# Patient Record
Sex: MALE | Race: WHITE | NOT HISPANIC OR LATINO | Employment: UNEMPLOYED | URBAN - NONMETROPOLITAN AREA
[De-identification: names, ages, dates, MRNs, and addresses within clinical notes are randomized per-mention and may not be internally consistent; named-entity substitution may affect disease eponyms.]

---

## 2020-09-03 ENCOUNTER — HOSPITAL ENCOUNTER (EMERGENCY)
Facility: HOSPITAL | Age: 60
Discharge: HOME | End: 2020-09-03
Attending: EMERGENCY MEDICINE | Admitting: EMERGENCY MEDICINE

## 2020-09-03 ENCOUNTER — APPOINTMENT (OUTPATIENT)
Dept: GENERAL RADIOLOGY | Facility: HOSPITAL | Age: 60
End: 2020-09-03

## 2020-09-03 VITALS
BODY MASS INDEX: 27.3 KG/M2 | HEART RATE: 85 BPM | TEMPERATURE: 98.6 F | OXYGEN SATURATION: 97 % | HEIGHT: 73 IN | SYSTOLIC BLOOD PRESSURE: 136 MMHG | RESPIRATION RATE: 16 BRPM | DIASTOLIC BLOOD PRESSURE: 91 MMHG | WEIGHT: 206 LBS

## 2020-09-03 DIAGNOSIS — S46.912A LEFT SHOULDER STRAIN, INITIAL ENCOUNTER: Primary | ICD-10-CM

## 2020-09-03 PROCEDURE — 73030 X-RAY EXAM OF SHOULDER: CPT

## 2020-09-03 PROCEDURE — 63710000001 PREDNISONE PER 1 MG: Performed by: PHYSICIAN ASSISTANT

## 2020-09-03 PROCEDURE — 99283 EMERGENCY DEPT VISIT LOW MDM: CPT

## 2020-09-03 RX ORDER — ACETAMINOPHEN 325 MG/1
650 TABLET ORAL ONCE
Status: COMPLETED | OUTPATIENT
Start: 2020-09-03 | End: 2020-09-03

## 2020-09-03 RX ORDER — IBUPROFEN 600 MG/1
600 TABLET ORAL EVERY 8 HOURS PRN
Qty: 18 TABLET | Refills: 0 | Status: SHIPPED | OUTPATIENT
Start: 2020-09-03

## 2020-09-03 RX ORDER — PREDNISONE 20 MG/1
20 TABLET ORAL ONCE
Status: COMPLETED | OUTPATIENT
Start: 2020-09-03 | End: 2020-09-03

## 2020-09-03 RX ORDER — SENNOSIDES 8.6 MG
650 CAPSULE ORAL EVERY 8 HOURS PRN
Qty: 18 TABLET | Refills: 0 | Status: SHIPPED | OUTPATIENT
Start: 2020-09-03

## 2020-09-03 RX ORDER — CYCLOBENZAPRINE HCL 5 MG
5 TABLET ORAL NIGHTLY PRN
Qty: 7 TABLET | Refills: 0 | Status: SHIPPED | OUTPATIENT
Start: 2020-09-03

## 2020-09-03 RX ADMIN — PREDNISONE 20 MG: 20 TABLET ORAL at 18:52

## 2020-09-03 RX ADMIN — ACETAMINOPHEN 650 MG: 325 TABLET, FILM COATED ORAL at 18:52

## 2020-09-03 NOTE — ED PROVIDER NOTES
Subjective   Patient is here with complaint of some soreness left shoulder secondary to a slip and fall over a month ago hitting left shoulder patient is continued have some pain especially with range of motion working, no numbness or chest pain or shortness of air reported patient does endorse some paresthesias at times in the left arm from this injury presents here ambulatory        History provided by:  Patient      Review of Systems   Constitutional: Negative.    HENT: Negative.    Eyes: Negative.    Respiratory: Negative.    Cardiovascular: Negative.  Negative for chest pain.   Musculoskeletal: Positive for arthralgias.   Skin: Negative.    Neurological: Negative.    All other systems reviewed and are negative.      No past medical history on file.    No Known Allergies    No past surgical history on file.    No family history on file.    Social History     Socioeconomic History   • Marital status:      Spouse name: Not on file   • Number of children: Not on file   • Years of education: Not on file   • Highest education level: Not on file           Objective   Physical Exam   Constitutional: He is oriented to person, place, and time. He appears well-developed and well-nourished. No distress.   Nontoxic well-appearing no acute distress   HENT:   Head: Normocephalic and atraumatic.   Eyes: Pupils are equal, round, and reactive to light. EOM are normal.   Neck: Normal range of motion. Neck supple.   No C-spine tenderness   Cardiovascular: Normal rate, regular rhythm and intact distal pulses.   Pulmonary/Chest: Effort normal and breath sounds normal.   Musculoskeletal: He exhibits tenderness. He exhibits no edema or deformity.   Noted tenderness left subacromial fossa left anterior deltoid margin, abduction 90 degrees forward flexion 90 degrees left, internal rotation to approximately L5-S1   Neurological: He is alert and oriented to person, place, and time. No cranial nerve deficit or sensory deficit. He  exhibits normal muscle tone. Coordination normal.   Skin: Skin is warm and dry. Capillary refill takes less than 2 seconds. No rash noted. He is not diaphoretic. No erythema. No pallor.   Psychiatric: He has a normal mood and affect. His behavior is normal. Judgment and thought content normal.   Nursing note and vitals reviewed.      Procedures           ED Course  ED Course as of Sep 03 1919   Thu Sep 03, 2020   1918 Is resting no distress will have patient follow-up with orthopedics... Scheduled to follow-up with orthopedics Dr. Dejuan ibarra, use medication as needed return to the ER for any problems follow-up any worsening concerns    [SC]      ED Course User Index  [SC] Charli Powell PA-C                                           MDM  Number of Diagnoses or Management Options     Amount and/or Complexity of Data Reviewed  Obtain history from someone other than the patient: yes  Review and summarize past medical records: yes  Discuss the patient with other providers: yes    Risk of Complications, Morbidity, and/or Mortality  Presenting problems: low  Diagnostic procedures: low  Management options: low        Final diagnoses:   Left shoulder strain, initial encounter            Charli Powell PA-C  09/03/20 1919

## 2021-04-06 ENCOUNTER — OFFICE VISIT (OUTPATIENT)
Dept: SURGERY | Facility: CLINIC | Age: 61
End: 2021-04-06

## 2021-04-06 VITALS
HEART RATE: 87 BPM | HEIGHT: 73 IN | SYSTOLIC BLOOD PRESSURE: 130 MMHG | WEIGHT: 204 LBS | OXYGEN SATURATION: 98 % | TEMPERATURE: 98.7 F | BODY MASS INDEX: 27.04 KG/M2 | DIASTOLIC BLOOD PRESSURE: 86 MMHG

## 2021-04-06 DIAGNOSIS — R10.11 RIGHT UPPER QUADRANT ABDOMINAL PAIN: ICD-10-CM

## 2021-04-06 DIAGNOSIS — R10.13 EPIGASTRIC PAIN: ICD-10-CM

## 2021-04-06 DIAGNOSIS — R10.84 GENERALIZED ABDOMINAL PAIN: Primary | ICD-10-CM

## 2021-04-06 DIAGNOSIS — Z01.818 PRE-OP TESTING: Primary | ICD-10-CM

## 2021-04-06 PROCEDURE — 99244 OFF/OP CNSLTJ NEW/EST MOD 40: CPT | Performed by: SURGERY

## 2021-04-06 RX ORDER — DM/PE/ACETAMINOPHEN/CHLORPHENR 10-5-325-2
1 TABLET, SEQUENTIAL ORAL 2 TIMES DAILY
COMMUNITY
Start: 2021-02-22

## 2021-04-06 RX ORDER — GLIMEPIRIDE 1 MG/1
TABLET ORAL
COMMUNITY
Start: 2021-03-16

## 2021-04-06 RX ORDER — OMEPRAZOLE 20 MG/1
20 CAPSULE, DELAYED RELEASE ORAL 2 TIMES DAILY
COMMUNITY
Start: 2021-02-22

## 2021-04-06 RX ORDER — MELOXICAM 15 MG/1
TABLET ORAL
COMMUNITY
Start: 2021-01-26

## 2021-04-06 RX ORDER — LEVOTHYROXINE SODIUM 0.07 MG/1
75 TABLET ORAL DAILY
COMMUNITY
Start: 2021-02-22

## 2021-04-06 RX ORDER — ATORVASTATIN CALCIUM 40 MG/1
TABLET, FILM COATED ORAL
COMMUNITY
Start: 2021-02-22

## 2021-04-06 NOTE — PROGRESS NOTES
Patient: Seb Pettit    YOB: 1960    Date: 04/06/2021    Primary Care Provider: Amado Phillips PA    Chief Complaint   Patient presents with   • Cholelithiasis       SUBJECTIVE:    History of present illness:  I saw the patient in the office today as a consultation for evaluation and treatment of right upper quadrant area abdominal pain.  Patient had ultrasound of the gallbladder performed 03/23/2021 which showed gallstones.    The patient describes this discomfort as epigastric and right upper quadrant in nature, radiates into the back, associated with nausea, there is a history significant for reflux, this pain is sharp in nature, worse with food, not relieved.    It is interesting that the patient has had a previous gallbladder ultrasound in [place] which shows evidence of gallstones in the impression of the report but in the body of the report it states that there was no gallstones seen.    The following portions of the patient's history were reviewed and updated as appropriate: allergies, current medications, past family history, past medical history, past social history, past surgical history and problem list.    Review of Systems   Constitutional: Negative for chills, fever and unexpected weight change.   HENT: Negative for trouble swallowing and voice change.    Eyes: Negative for visual disturbance.   Respiratory: Negative for apnea, cough, chest tightness, shortness of breath and wheezing.    Cardiovascular: Negative for chest pain, palpitations and leg swelling.   Gastrointestinal: Positive for abdominal pain. Negative for abdominal distention, anal bleeding, blood in stool, constipation, diarrhea, nausea, rectal pain and vomiting.   Endocrine: Negative for cold intolerance and heat intolerance.   Genitourinary: Negative for difficulty urinating, dysuria, flank pain, scrotal swelling and testicular pain.   Musculoskeletal: Negative for back pain, gait problem and joint swelling.    Skin: Negative for color change, rash and wound.   Neurological: Negative for dizziness, syncope, speech difficulty, weakness, numbness and headaches.   Hematological: Negative for adenopathy. Does not bruise/bleed easily.   Psychiatric/Behavioral: Negative for confusion. The patient is not nervous/anxious.        History:  Past Medical History:   Diagnosis Date   • Diabetes mellitus (CMS/HCC)        History reviewed. No pertinent surgical history.    History reviewed. No pertinent family history.    Social History     Tobacco Use   • Smoking status: Never Smoker   • Smokeless tobacco: Never Used   Vaping Use   • Vaping Use: Never used   Substance Use Topics   • Alcohol use: Never   • Drug use: Never       Allergies:  No Known Allergies    Medications:    Current Outpatient Medications:   •  atorvastatin (LIPITOR) 40 MG tablet, TAKE 1 TABLET BY MOUTH AT BEDTIME- HOLD UNTIL NIACIN HAS BEEN FINISHED, Disp: , Rfl:   •  glimepiride (AMARYL) 1 MG tablet, , Disp: , Rfl:   •  GNP Vitamin D Super Strength 125 MCG (5000 UT) tablet, Take 1 tablet by mouth 2 (Two) Times a Day., Disp: , Rfl:   •  levothyroxine (SYNTHROID, LEVOTHROID) 75 MCG tablet, Take 75 mcg by mouth Daily., Disp: , Rfl:   •  metFORMIN (GLUCOPHAGE) 1000 MG tablet, , Disp: , Rfl:   •  omeprazole (priLOSEC) 20 MG capsule, Take 20 mg by mouth 2 (Two) Times a Day., Disp: , Rfl:   •  acetaminophen (Tylenol 8 Hour) 650 MG 8 hr tablet, Take 1 tablet by mouth Every 8 (Eight) Hours As Needed for Mild Pain ., Disp: 18 tablet, Rfl: 0  •  cyclobenzaprine (FLEXERIL) 5 MG tablet, Take 1 tablet by mouth At Night As Needed for Muscle Spasms., Disp: 7 tablet, Rfl: 0  •  ibuprofen (ADVIL,MOTRIN) 600 MG tablet, Take 1 tablet by mouth Every 8 (Eight) Hours As Needed for Mild Pain ., Disp: 18 tablet, Rfl: 0  •  meloxicam (MOBIC) 15 MG tablet, TAKE 1 TABLET BY MOUTH DAILY; FOR LEG PAIN, Disp: , Rfl:     OBJECTIVE:    Vital Signs:   Vitals:    04/06/21 1310   BP: 130/86   Pulse:  "87   Temp: 98.7 °F (37.1 °C)   SpO2: 98%   Weight: 92.5 kg (204 lb)   Height: 185.4 cm (72.99\")       Physical Exam:   General Appearance:    Alert, cooperative, in no acute distress   Head:    Normocephalic, without obvious abnormality, atraumatic   Eyes:            Lids and lashes normal, conjunctivae and sclerae normal, no   icterus, no pallor, corneas clear, PERRLA   Ears:    Ears appear intact with no abnormalities noted   Throat:   No oral lesions, no thrush, oral mucosa moist   Neck:   No adenopathy, supple, trachea midline, no thyromegaly, no   carotid bruit, no JVD   Lungs:     Clear to auscultation,respirations regular, even and                  unlabored    Heart:    Regular rhythm and normal rate, normal S1 and S2, no            murmur   Abdomen:     no masses, no organomegaly, soft non-tender, non-distended, no guarding, there is evidence of right upper quadrant tenderness, no peritoneal signs   Extremities:   Moves all extremities well, no edema, no cyanosis, no             redness   Pulses:   Pulses palpable and equal bilaterally   Skin:   No bleeding, bruising or rash   Lymph nodes:   No palpable adenopathy   Neurologic:   Cranial nerves 2 - 12 grossly intact, sensation intact      Results Review:   I reviewed the patient's new clinical results.  I reviewed the patient's new imaging results and agree with the interpretation.  I reviewed the patient's other test results and agree with the interpretation    Review of Systems was reviewed and confirmed as accurate as documented by the MA.    ASSESSMENT/PLAN:    1. Generalized abdominal pain    2. Right upper quadrant abdominal pain    3. Epigastric pain        I had a detailed and extensive discussion with the patient and his wife in the office today.  We did our own ultrasound in the office and this showed no evidence of gallstones, I think the patient needs to be scheduled for HIDA scan.  He also needs to undergo upper endoscopy, risk and benefits of " operative versus nonoperative intervention have been discussed with the patient, he understands and agrees, and wishes to proceed.     I discussed the patients findings and my recommendations with patient.    Electronically signed by Luis A Ojeda MD  04/06/21

## 2021-04-14 ENCOUNTER — LAB (OUTPATIENT)
Dept: LAB | Facility: HOSPITAL | Age: 61
End: 2021-04-14

## 2021-04-14 ENCOUNTER — HOSPITAL ENCOUNTER (OUTPATIENT)
Dept: NUCLEAR MEDICINE | Facility: HOSPITAL | Age: 61
Discharge: HOME | End: 2021-04-14

## 2021-04-14 DIAGNOSIS — R10.13 EPIGASTRIC PAIN: ICD-10-CM

## 2021-04-14 DIAGNOSIS — R10.84 GENERALIZED ABDOMINAL PAIN: ICD-10-CM

## 2021-04-14 DIAGNOSIS — R10.11 RIGHT UPPER QUADRANT ABDOMINAL PAIN: ICD-10-CM

## 2021-04-14 DIAGNOSIS — Z01.818 PRE-OP TESTING: ICD-10-CM

## 2021-04-14 LAB — SARS-COV-2 RNA NOSE QL NAA+PROBE: NOT DETECTED

## 2021-04-14 PROCEDURE — A9537 TC99M MEBROFENIN: HCPCS | Performed by: SURGERY

## 2021-04-14 PROCEDURE — C9803 HOPD COVID-19 SPEC COLLECT: HCPCS

## 2021-04-14 PROCEDURE — 78227 HEPATOBIL SYST IMAGE W/DRUG: CPT

## 2021-04-14 PROCEDURE — 0 TECHNETIUM TC 99M MEBROFENIN KIT: Performed by: SURGERY

## 2021-04-14 PROCEDURE — U0004 COV-19 TEST NON-CDC HGH THRU: HCPCS

## 2021-04-14 RX ORDER — KIT FOR THE PREPARATION OF TECHNETIUM TC 99M MEBROFENIN 45 MG/10ML
1 INJECTION, POWDER, LYOPHILIZED, FOR SOLUTION INTRAVENOUS
Status: COMPLETED | OUTPATIENT
Start: 2021-04-14 | End: 2021-04-14

## 2021-04-14 RX ADMIN — MEBROFENIN 1 DOSE: 45 INJECTION, POWDER, LYOPHILIZED, FOR SOLUTION INTRAVENOUS at 11:37

## 2021-04-16 ENCOUNTER — OUTSIDE FACILITY SERVICE (OUTPATIENT)
Dept: SURGERY | Facility: CLINIC | Age: 61
End: 2021-04-16

## 2021-04-16 PROCEDURE — 43239 EGD BIOPSY SINGLE/MULTIPLE: CPT | Performed by: SURGERY

## 2021-04-23 NOTE — PROGRESS NOTES
Patient: Seb Pettit    YOB: 1960    Date: 04/26/2021    Primary Care Provider: Amado Phillips PA    Reason for Consultation: Follow-up EGD    Chief Complaint   Patient presents with   • Follow-up     EGD and HIDA scan       History of present illness:  I saw the patient in the office today as a followup from their recent EGD with biopsy which showed superficial benign ulcers.  The pathology report did show minimal chronic gastritis.  Patient is also here for follow-up hida scan which was done 04/14/2021, it showed an ejection fraction @ 98%.  They state that they have done well but is still having some left side abdominal pain.    It is interesting that the patient had a previous gallbladder work-up in the hospital that showed gallstones, our ultrasound performed in my office showed no evidence of gallstones.    He describes this discomfort is right upper quadrant in nature, happens intermittently throughout the day, has been present for many months, can be worse with meals.  It has been worse recently, the patient has not had a recent CT scan of the abdomen pelvis, it has been many years since his last colonoscopy.  There is a question of colon polyps in the past.    The following portions of the patient's history were reviewed and updated as appropriate: allergies, current medications, past family history, past medical history, past social history, past surgical history and problem list.    Review of Systems   Constitutional: Negative for chills, fever and unexpected weight change.   HENT: Negative for trouble swallowing and voice change.    Eyes: Negative for visual disturbance.   Respiratory: Negative for apnea, cough, chest tightness, shortness of breath and wheezing.    Cardiovascular: Negative for chest pain, palpitations and leg swelling.   Gastrointestinal: Positive for abdominal pain. Negative for abdominal distention, anal bleeding, blood in stool, constipation, diarrhea, nausea,  "rectal pain and vomiting.   Endocrine: Negative for cold intolerance and heat intolerance.   Genitourinary: Negative for difficulty urinating, dysuria, flank pain, scrotal swelling and testicular pain.   Musculoskeletal: Negative for back pain, gait problem and joint swelling.   Skin: Negative for color change, rash and wound.   Neurological: Negative for dizziness, syncope, speech difficulty, weakness, numbness and headaches.   Hematological: Negative for adenopathy. Does not bruise/bleed easily.   Psychiatric/Behavioral: Negative for confusion. The patient is not nervous/anxious.        Vital Signs:   Vitals:    04/26/21 1035   BP: 126/78   Pulse: 93   Temp: 97.3 °F (36.3 °C)   SpO2: 99%   Weight: 95.3 kg (210 lb)   Height: 185.4 cm (72.99\")       Allergies:  No Known Allergies    Medications:    Current Outpatient Medications:   •  acetaminophen (Tylenol 8 Hour) 650 MG 8 hr tablet, Take 1 tablet by mouth Every 8 (Eight) Hours As Needed for Mild Pain ., Disp: 18 tablet, Rfl: 0  •  atorvastatin (LIPITOR) 40 MG tablet, TAKE 1 TABLET BY MOUTH AT BEDTIME- HOLD UNTIL NIACIN HAS BEEN FINISHED, Disp: , Rfl:   •  cyclobenzaprine (FLEXERIL) 5 MG tablet, Take 1 tablet by mouth At Night As Needed for Muscle Spasms., Disp: 7 tablet, Rfl: 0  •  glimepiride (AMARYL) 1 MG tablet, , Disp: , Rfl:   •  GNP Vitamin D Super Strength 125 MCG (5000 UT) tablet, Take 1 tablet by mouth 2 (Two) Times a Day., Disp: , Rfl:   •  ibuprofen (ADVIL,MOTRIN) 600 MG tablet, Take 1 tablet by mouth Every 8 (Eight) Hours As Needed for Mild Pain ., Disp: 18 tablet, Rfl: 0  •  levothyroxine (SYNTHROID, LEVOTHROID) 75 MCG tablet, Take 75 mcg by mouth Daily., Disp: , Rfl:   •  meloxicam (MOBIC) 15 MG tablet, TAKE 1 TABLET BY MOUTH DAILY; FOR LEG PAIN, Disp: , Rfl:   •  metFORMIN (GLUCOPHAGE) 1000 MG tablet, , Disp: , Rfl:   •  omeprazole (priLOSEC) 20 MG capsule, Take 20 mg by mouth 2 (Two) Times a Day., Disp: , Rfl:     Physical Exam:   General " Appearance:    Alert, cooperative, in no acute distress   Abdomen:     no masses, no organomegaly, soft non-tender, non-distended, no guarding, wounds are well healed, no evidence of recurrent hernia, no peritoneal signs   Chest:      Clear toausculation            Cor:  Regular rate and rhythm      Results Review:   I reviewed the patient's new clinical results.  I reviewed the patient's new imaging results and agree with the interpretation.  I reviewed the patient's other test results and agree with the interpretation    Review of Systems was reviewed and confirmed as accurate as documented by the MA.    Assessment / Plan:    1. Right lower quadrant abdominal pain    2. Right upper quadrant abdominal pain    3. Generalized abdominal pain    4. History of colon polyps        I did discuss the situation with the patient today in the office and they have done well from their recent EGD with biopsy. I have told the patient he needs to stay on his current dosage of omeprazole, also think the patient needs to have a CT scan of the abdomen and pelvis with oral and IV contrast.    As continuation of our work-up I think he needs to undergo colonoscopy, risk and benefits of operative versus nonoperative intervention have been discussed with the patient and the wife, they understand and agree, and wished to proceed.    Electronically signed by Luis A Ojeda MD  04/26/21

## 2021-04-26 ENCOUNTER — OFFICE VISIT (OUTPATIENT)
Dept: SURGERY | Facility: CLINIC | Age: 61
End: 2021-04-26

## 2021-04-26 VITALS
HEIGHT: 73 IN | BODY MASS INDEX: 27.83 KG/M2 | DIASTOLIC BLOOD PRESSURE: 78 MMHG | OXYGEN SATURATION: 99 % | HEART RATE: 93 BPM | SYSTOLIC BLOOD PRESSURE: 126 MMHG | WEIGHT: 210 LBS | TEMPERATURE: 97.3 F

## 2021-04-26 DIAGNOSIS — R10.11 RIGHT UPPER QUADRANT ABDOMINAL PAIN: ICD-10-CM

## 2021-04-26 DIAGNOSIS — Z86.010 HISTORY OF COLON POLYPS: ICD-10-CM

## 2021-04-26 DIAGNOSIS — R10.10 PAIN OF UPPER ABDOMEN: ICD-10-CM

## 2021-04-26 DIAGNOSIS — R10.84 GENERALIZED ABDOMINAL PAIN: ICD-10-CM

## 2021-04-26 DIAGNOSIS — R10.31 RIGHT LOWER QUADRANT ABDOMINAL PAIN: Primary | ICD-10-CM

## 2021-04-26 DIAGNOSIS — Z01.818 PRE-OP TESTING: Primary | ICD-10-CM

## 2021-04-26 PROCEDURE — 99213 OFFICE O/P EST LOW 20 MIN: CPT | Performed by: SURGERY

## 2021-04-26 RX ORDER — POLYETHYLENE GLYCOL 3350 17 G/17G
238 POWDER, FOR SOLUTION ORAL ONCE
Qty: 238 G | Refills: 0 | Status: SHIPPED | OUTPATIENT
Start: 2021-04-26 | End: 2021-04-26

## 2021-04-26 RX ORDER — BISACODYL 5 MG
TABLET, DELAYED RELEASE (ENTERIC COATED) ORAL
Qty: 4 TABLET | Refills: 0 | Status: SHIPPED | OUTPATIENT
Start: 2021-04-26

## 2021-05-01 ENCOUNTER — LAB (OUTPATIENT)
Dept: LAB | Facility: HOSPITAL | Age: 61
End: 2021-05-01

## 2021-05-01 DIAGNOSIS — Z01.818 PRE-OP TESTING: ICD-10-CM

## 2021-05-01 PROCEDURE — U0004 COV-19 TEST NON-CDC HGH THRU: HCPCS

## 2021-05-01 PROCEDURE — C9803 HOPD COVID-19 SPEC COLLECT: HCPCS

## 2021-05-02 LAB — SARS-COV-2 RNA NOSE QL NAA+PROBE: NOT DETECTED

## 2021-05-04 ENCOUNTER — OUTSIDE FACILITY SERVICE (OUTPATIENT)
Dept: SURGERY | Facility: CLINIC | Age: 61
End: 2021-05-04

## 2021-05-04 PROCEDURE — 45384 COLONOSCOPY W/LESION REMOVAL: CPT | Performed by: SURGERY

## 2021-05-07 ENCOUNTER — HOSPITAL ENCOUNTER (OUTPATIENT)
Dept: CT IMAGING | Facility: HOSPITAL | Age: 61
Discharge: HOME | End: 2021-05-07
Admitting: SURGERY

## 2021-05-07 DIAGNOSIS — R10.10 PAIN OF UPPER ABDOMEN: ICD-10-CM

## 2021-05-07 LAB — CREAT BLDA-MCNC: 0.8 MG/DL (ref 0.6–1.3)

## 2021-05-07 PROCEDURE — 82565 ASSAY OF CREATININE: CPT

## 2021-05-07 PROCEDURE — 25010000002 IOPAMIDOL 61 % SOLUTION: Performed by: SURGERY

## 2021-05-07 PROCEDURE — 74177 CT ABD & PELVIS W/CONTRAST: CPT

## 2021-05-07 RX ADMIN — IOPAMIDOL 100 ML: 612 INJECTION, SOLUTION INTRAVENOUS at 09:29

## 2021-05-10 ENCOUNTER — TELEPHONE (OUTPATIENT)
Dept: SURGERY | Facility: CLINIC | Age: 61
End: 2021-05-10

## 2021-05-10 NOTE — TELEPHONE ENCOUNTER
Patient called requesting results from his CT scan. Please return patient's call at [phone].

## 2021-05-18 ENCOUNTER — OFFICE VISIT (OUTPATIENT)
Dept: SURGERY | Facility: CLINIC | Age: 61
End: 2021-05-18

## 2021-05-18 VITALS
HEIGHT: 73 IN | HEART RATE: 104 BPM | TEMPERATURE: 98 F | WEIGHT: 206.4 LBS | DIASTOLIC BLOOD PRESSURE: 80 MMHG | OXYGEN SATURATION: 99 % | SYSTOLIC BLOOD PRESSURE: 126 MMHG | BODY MASS INDEX: 27.35 KG/M2

## 2021-05-18 DIAGNOSIS — R10.10 PAIN OF UPPER ABDOMEN: Primary | ICD-10-CM

## 2021-05-18 PROCEDURE — 99213 OFFICE O/P EST LOW 20 MIN: CPT | Performed by: SURGERY

## 2022-02-26 ENCOUNTER — HOSPITAL ENCOUNTER (EMERGENCY)
Facility: HOSPITAL | Age: 62
Discharge: HOME | End: 2022-02-26
Attending: EMERGENCY MEDICINE | Admitting: EMERGENCY MEDICINE

## 2022-02-26 ENCOUNTER — APPOINTMENT (OUTPATIENT)
Dept: GENERAL RADIOLOGY | Facility: HOSPITAL | Age: 62
End: 2022-02-26

## 2022-02-26 VITALS
TEMPERATURE: 97.5 F | RESPIRATION RATE: 16 BRPM | DIASTOLIC BLOOD PRESSURE: 90 MMHG | SYSTOLIC BLOOD PRESSURE: 125 MMHG | BODY MASS INDEX: 27.7 KG/M2 | WEIGHT: 209 LBS | HEIGHT: 73 IN | OXYGEN SATURATION: 100 % | HEART RATE: 62 BPM

## 2022-02-26 DIAGNOSIS — E11.65 HYPERGLYCEMIA DUE TO DIABETES MELLITUS: Primary | ICD-10-CM

## 2022-02-26 DIAGNOSIS — U07.1 COVID-19: ICD-10-CM

## 2022-02-26 DIAGNOSIS — E86.0 DEHYDRATION: ICD-10-CM

## 2022-02-26 LAB
ALBUMIN SERPL-MCNC: 3.6 G/DL (ref 3.5–5.2)
ALBUMIN/GLOB SERPL: 0.9 G/DL
ALP SERPL-CCNC: 139 U/L (ref 39–117)
ALT SERPL W P-5'-P-CCNC: 24 U/L (ref 1–41)
ANION GAP SERPL CALCULATED.3IONS-SCNC: 15.5 MMOL/L (ref 5–15)
AST SERPL-CCNC: 21 U/L (ref 1–40)
ATMOSPHERIC PRESS: 743 MMHG
BASE EXCESS BLDV CALC-SCNC: 2.3 MMOL/L (ref 0–2)
BDY SITE: ABNORMAL
BILIRUB SERPL-MCNC: 0.8 MG/DL (ref 0–1.2)
BILIRUB UR QL STRIP: NEGATIVE
BUN SERPL-MCNC: 21 MG/DL (ref 6–20)
BUN/CREAT SERPL: 22.8 (ref 7–25)
CALCIUM SPEC-SCNC: 8.7 MG/DL (ref 8.6–10.5)
CHLORIDE SERPL-SCNC: 92 MMOL/L (ref 98–107)
CLARITY UR: CLEAR
CO2 SERPL-SCNC: 25.5 MMOL/L (ref 22–29)
COLOR UR: YELLOW
CREAT SERPL-MCNC: 0.92 MG/DL (ref 0.76–1.27)
DEPRECATED RDW RBC AUTO: 37.4 FL (ref 37–54)
ERYTHROCYTE [DISTWIDTH] IN BLOOD BY AUTOMATED COUNT: 12.1 % (ref 12.3–15.4)
GFR SERPL CREATININE-BSD FRML MDRD: 84 ML/MIN/1.73
GLOBULIN UR ELPH-MCNC: 3.8 GM/DL
GLUCOSE SERPL-MCNC: 405 MG/DL (ref 65–99)
GLUCOSE UR STRIP-MCNC: ABNORMAL MG/DL
HCO3 BLDV-SCNC: 27.6 MMOL/L (ref 22–28)
HCT VFR BLD AUTO: 51.2 % (ref 37.5–51)
HGB BLD-MCNC: 17.5 G/DL (ref 13–17.7)
HGB UR QL STRIP.AUTO: NEGATIVE
HOLD SPECIMEN: NORMAL
HOLD SPECIMEN: NORMAL
INHALED O2 CONCENTRATION: 21 %
KETONES UR QL STRIP: ABNORMAL
LEUKOCYTE ESTERASE UR QL STRIP.AUTO: NEGATIVE
LIPASE SERPL-CCNC: 19 U/L (ref 13–60)
LYMPHOCYTES # BLD MANUAL: 2.13 10*3/MM3 (ref 0.7–3.1)
LYMPHOCYTES NFR BLD MANUAL: 8 % (ref 5–12)
Lab: ABNORMAL
MAGNESIUM SERPL-MCNC: 2.1 MG/DL (ref 1.6–2.6)
MCH RBC QN AUTO: 29.3 PG (ref 26.6–33)
MCHC RBC AUTO-ENTMCNC: 34.2 G/DL (ref 31.5–35.7)
MCV RBC AUTO: 85.8 FL (ref 79–97)
MODALITY: ABNORMAL
MONOCYTES # BLD: 0.66 10*3/MM3 (ref 0.1–0.9)
NEUTROPHILS # BLD AUTO: 5.41 10*3/MM3 (ref 1.7–7)
NEUTROPHILS NFR BLD MANUAL: 65 % (ref 42.7–76)
NEUTS BAND NFR BLD MANUAL: 1 % (ref 0–5)
NITRITE UR QL STRIP: NEGATIVE
NT-PROBNP SERPL-MCNC: 143.6 PG/ML (ref 0–900)
PCO2 BLDV: 44.2 MM HG (ref 40–50)
PH BLDV: 7.4 PH UNITS (ref 7.32–7.42)
PH UR STRIP.AUTO: <=5 [PH] (ref 5–8)
PLAT MORPH BLD: NORMAL
PLATELET # BLD AUTO: 215 10*3/MM3 (ref 140–450)
PMV BLD AUTO: 10.5 FL (ref 6–12)
PO2 BLDV: 42.8 MM HG (ref 30–50)
POTASSIUM SERPL-SCNC: 4.4 MMOL/L (ref 3.5–5.2)
PROCALCITONIN SERPL-MCNC: 0.14 NG/ML (ref 0–0.25)
PROT SERPL-MCNC: 7.4 G/DL (ref 6–8.5)
PROT UR QL STRIP: NEGATIVE
RBC # BLD AUTO: 5.97 10*6/MM3 (ref 4.14–5.8)
RBC MORPH BLD: NORMAL
SAO2 % BLDCOV: 78 % (ref 45–75)
SCAN SLIDE: NORMAL
SMALL PLATELETS BLD QL SMEAR: ADEQUATE
SODIUM SERPL-SCNC: 133 MMOL/L (ref 136–145)
SP GR UR STRIP: >=1.03 (ref 1–1.03)
TROPONIN T SERPL-MCNC: <0.01 NG/ML (ref 0–0.03)
UROBILINOGEN UR QL STRIP: ABNORMAL
VARIANT LYMPHS NFR BLD MANUAL: 21 % (ref 19.6–45.3)
VARIANT LYMPHS NFR BLD MANUAL: 5 % (ref 0–5)
VENTILATOR MODE: ABNORMAL
WBC MORPH BLD: NORMAL
WBC NRBC COR # BLD: 8.19 10*3/MM3 (ref 3.4–10.8)
WHOLE BLOOD HOLD SPECIMEN: NORMAL
WHOLE BLOOD HOLD SPECIMEN: NORMAL

## 2022-02-26 PROCEDURE — 84484 ASSAY OF TROPONIN QUANT: CPT | Performed by: PHYSICIAN ASSISTANT

## 2022-02-26 PROCEDURE — 80053 COMPREHEN METABOLIC PANEL: CPT | Performed by: PHYSICIAN ASSISTANT

## 2022-02-26 PROCEDURE — 82805 BLOOD GASES W/O2 SATURATION: CPT

## 2022-02-26 PROCEDURE — 71045 X-RAY EXAM CHEST 1 VIEW: CPT

## 2022-02-26 PROCEDURE — 25010000002 ONDANSETRON PER 1 MG: Performed by: PHYSICIAN ASSISTANT

## 2022-02-26 PROCEDURE — 85025 COMPLETE CBC W/AUTO DIFF WBC: CPT | Performed by: PHYSICIAN ASSISTANT

## 2022-02-26 PROCEDURE — 83690 ASSAY OF LIPASE: CPT | Performed by: PHYSICIAN ASSISTANT

## 2022-02-26 PROCEDURE — 84145 PROCALCITONIN (PCT): CPT | Performed by: PHYSICIAN ASSISTANT

## 2022-02-26 PROCEDURE — 36415 COLL VENOUS BLD VENIPUNCTURE: CPT

## 2022-02-26 PROCEDURE — 93005 ELECTROCARDIOGRAM TRACING: CPT | Performed by: PHYSICIAN ASSISTANT

## 2022-02-26 PROCEDURE — 83735 ASSAY OF MAGNESIUM: CPT | Performed by: PHYSICIAN ASSISTANT

## 2022-02-26 PROCEDURE — 96374 THER/PROPH/DIAG INJ IV PUSH: CPT

## 2022-02-26 PROCEDURE — 99283 EMERGENCY DEPT VISIT LOW MDM: CPT

## 2022-02-26 PROCEDURE — 83880 ASSAY OF NATRIURETIC PEPTIDE: CPT | Performed by: PHYSICIAN ASSISTANT

## 2022-02-26 PROCEDURE — 85007 BL SMEAR W/DIFF WBC COUNT: CPT | Performed by: PHYSICIAN ASSISTANT

## 2022-02-26 PROCEDURE — 81003 URINALYSIS AUTO W/O SCOPE: CPT | Performed by: PHYSICIAN ASSISTANT

## 2022-02-26 RX ORDER — SODIUM CHLORIDE 0.9 % (FLUSH) 0.9 %
10 SYRINGE (ML) INJECTION AS NEEDED
Status: DISCONTINUED | OUTPATIENT
Start: 2022-02-26 | End: 2022-02-27 | Stop reason: HOSPADM

## 2022-02-26 RX ORDER — ONDANSETRON 2 MG/ML
4 INJECTION INTRAMUSCULAR; INTRAVENOUS ONCE
Status: COMPLETED | OUTPATIENT
Start: 2022-02-26 | End: 2022-02-26

## 2022-02-26 RX ORDER — ACETAMINOPHEN 500 MG
1000 TABLET ORAL ONCE
Status: COMPLETED | OUTPATIENT
Start: 2022-02-26 | End: 2022-02-26

## 2022-02-26 RX ORDER — ONDANSETRON 4 MG/1
4 TABLET, ORALLY DISINTEGRATING ORAL EVERY 8 HOURS PRN
Qty: 12 TABLET | Refills: 0 | Status: SHIPPED | OUTPATIENT
Start: 2022-02-26

## 2022-02-26 RX ADMIN — ONDANSETRON 4 MG: 2 INJECTION INTRAMUSCULAR; INTRAVENOUS at 21:36

## 2022-02-26 RX ADMIN — SODIUM CHLORIDE 1000 ML: 9 INJECTION, SOLUTION INTRAVENOUS at 22:03

## 2022-02-26 RX ADMIN — SODIUM CHLORIDE 1000 ML: 9 INJECTION, SOLUTION INTRAVENOUS at 21:36

## 2022-02-26 RX ADMIN — ACETAMINOPHEN 1000 MG: 500 TABLET, COATED ORAL at 21:36

## 2023-04-28 ENCOUNTER — APPOINTMENT (OUTPATIENT)
Dept: GENERAL RADIOLOGY | Facility: HOSPITAL | Age: 63
DRG: 638 | End: 2023-04-28
Payer: COMMERCIAL

## 2023-04-28 ENCOUNTER — HOSPITAL ENCOUNTER (INPATIENT)
Facility: HOSPITAL | Age: 63
LOS: 4 days | Discharge: HOME HEALTH | DRG: 638 | End: 2023-05-03
Attending: STUDENT IN AN ORGANIZED HEALTH CARE EDUCATION/TRAINING PROGRAM
Payer: COMMERCIAL

## 2023-04-28 DIAGNOSIS — L03.119 CELLULITIS IN DIABETIC FOOT: Primary | ICD-10-CM

## 2023-04-28 DIAGNOSIS — E11.628 CELLULITIS IN DIABETIC FOOT: Primary | ICD-10-CM

## 2023-04-28 LAB
ALBUMIN SERPL-MCNC: 3.6 G/DL (ref 3.5–5.2)
ALBUMIN/GLOB SERPL: 0.8 G/DL
ALP SERPL-CCNC: 130 U/L (ref 39–117)
ALT SERPL W P-5'-P-CCNC: 17 U/L (ref 1–41)
ANION GAP SERPL CALCULATED.3IONS-SCNC: 13 MMOL/L (ref 5–15)
AST SERPL-CCNC: 16 U/L (ref 1–40)
BASOPHILS # BLD AUTO: 0.05 10*3/MM3 (ref 0–0.2)
BASOPHILS NFR BLD AUTO: 0.3 % (ref 0–1.5)
BILIRUB SERPL-MCNC: 0.9 MG/DL (ref 0–1.2)
BUN SERPL-MCNC: 17 MG/DL (ref 8–23)
BUN/CREAT SERPL: 20.2 (ref 7–25)
CALCIUM SPEC-SCNC: 8.9 MG/DL (ref 8.6–10.5)
CHLORIDE SERPL-SCNC: 96 MMOL/L (ref 98–107)
CO2 SERPL-SCNC: 23 MMOL/L (ref 22–29)
CREAT SERPL-MCNC: 0.84 MG/DL (ref 0.76–1.27)
CRP SERPL-MCNC: 13.21 MG/DL (ref 0–0.5)
D-LACTATE SERPL-SCNC: 1.7 MMOL/L (ref 0.5–2)
DEPRECATED RDW RBC AUTO: 35.3 FL (ref 37–54)
EGFRCR SERPLBLD CKD-EPI 2021: 99.8 ML/MIN/1.73
EOSINOPHIL # BLD AUTO: 0.04 10*3/MM3 (ref 0–0.4)
EOSINOPHIL NFR BLD AUTO: 0.2 % (ref 0.3–6.2)
ERYTHROCYTE [DISTWIDTH] IN BLOOD BY AUTOMATED COUNT: 12.1 % (ref 12.3–15.4)
ERYTHROCYTE [SEDIMENTATION RATE] IN BLOOD: 64 MM/HR (ref 0–20)
GLOBULIN UR ELPH-MCNC: 4.4 GM/DL
GLUCOSE BLDC GLUCOMTR-MCNC: 285 MG/DL (ref 70–130)
GLUCOSE SERPL-MCNC: 320 MG/DL (ref 65–99)
HCT VFR BLD AUTO: 41.6 % (ref 37.5–51)
HGB BLD-MCNC: 14.5 G/DL (ref 13–17.7)
HOLD SPECIMEN: NORMAL
HOLD SPECIMEN: NORMAL
IMM GRANULOCYTES # BLD AUTO: 0.08 10*3/MM3 (ref 0–0.05)
IMM GRANULOCYTES NFR BLD AUTO: 0.5 % (ref 0–0.5)
LYMPHOCYTES # BLD AUTO: 2.27 10*3/MM3 (ref 0.7–3.1)
LYMPHOCYTES NFR BLD AUTO: 13.3 % (ref 19.6–45.3)
MCH RBC QN AUTO: 28.2 PG (ref 26.6–33)
MCHC RBC AUTO-ENTMCNC: 34.9 G/DL (ref 31.5–35.7)
MCV RBC AUTO: 80.8 FL (ref 79–97)
MONOCYTES # BLD AUTO: 1.78 10*3/MM3 (ref 0.1–0.9)
MONOCYTES NFR BLD AUTO: 10.4 % (ref 5–12)
NEUTROPHILS NFR BLD AUTO: 12.83 10*3/MM3 (ref 1.7–7)
NEUTROPHILS NFR BLD AUTO: 75.3 % (ref 42.7–76)
NRBC BLD AUTO-RTO: 0 /100 WBC (ref 0–0.2)
PLATELET # BLD AUTO: 257 10*3/MM3 (ref 140–450)
PMV BLD AUTO: 10.4 FL (ref 6–12)
POTASSIUM SERPL-SCNC: 3.6 MMOL/L (ref 3.5–5.2)
PROCALCITONIN SERPL-MCNC: 0.23 NG/ML (ref 0–0.25)
PROT SERPL-MCNC: 8 G/DL (ref 6–8.5)
RBC # BLD AUTO: 5.15 10*6/MM3 (ref 4.14–5.8)
SODIUM SERPL-SCNC: 132 MMOL/L (ref 136–145)
WBC NRBC COR # BLD: 17.05 10*3/MM3 (ref 3.4–10.8)
WHOLE BLOOD HOLD COAG: NORMAL
WHOLE BLOOD HOLD SPECIMEN: NORMAL

## 2023-04-28 PROCEDURE — 84145 PROCALCITONIN (PCT): CPT

## 2023-04-28 PROCEDURE — 87147 CULTURE TYPE IMMUNOLOGIC: CPT

## 2023-04-28 PROCEDURE — 25010000002 CEFEPIME-DEXTROSE 2-5 GM-%(50ML) RECONSTITUTED SOLUTION

## 2023-04-28 PROCEDURE — 83605 ASSAY OF LACTIC ACID: CPT

## 2023-04-28 PROCEDURE — 73630 X-RAY EXAM OF FOOT: CPT

## 2023-04-28 PROCEDURE — 87205 SMEAR GRAM STAIN: CPT

## 2023-04-28 PROCEDURE — 86140 C-REACTIVE PROTEIN: CPT

## 2023-04-28 PROCEDURE — 85652 RBC SED RATE AUTOMATED: CPT

## 2023-04-28 PROCEDURE — 83036 HEMOGLOBIN GLYCOSYLATED A1C: CPT | Performed by: STUDENT IN AN ORGANIZED HEALTH CARE EDUCATION/TRAINING PROGRAM

## 2023-04-28 PROCEDURE — 36415 COLL VENOUS BLD VENIPUNCTURE: CPT

## 2023-04-28 PROCEDURE — 96365 THER/PROPH/DIAG IV INF INIT: CPT

## 2023-04-28 PROCEDURE — 87186 SC STD MICRODIL/AGAR DIL: CPT

## 2023-04-28 PROCEDURE — 84443 ASSAY THYROID STIM HORMONE: CPT | Performed by: STUDENT IN AN ORGANIZED HEALTH CARE EDUCATION/TRAINING PROGRAM

## 2023-04-28 PROCEDURE — 80053 COMPREHEN METABOLIC PANEL: CPT

## 2023-04-28 PROCEDURE — 85025 COMPLETE CBC W/AUTO DIFF WBC: CPT

## 2023-04-28 PROCEDURE — 87070 CULTURE OTHR SPECIMN AEROBIC: CPT

## 2023-04-28 PROCEDURE — 87040 BLOOD CULTURE FOR BACTERIA: CPT

## 2023-04-28 PROCEDURE — 96367 TX/PROPH/DG ADDL SEQ IV INF: CPT

## 2023-04-28 PROCEDURE — 87077 CULTURE AEROBIC IDENTIFY: CPT

## 2023-04-28 PROCEDURE — 25010000002 VANCOMYCIN 5 G RECONSTITUTED SOLUTION

## 2023-04-28 PROCEDURE — 82948 REAGENT STRIP/BLOOD GLUCOSE: CPT

## 2023-04-28 PROCEDURE — 99285 EMERGENCY DEPT VISIT HI MDM: CPT

## 2023-04-28 RX ORDER — CEFEPIME HYDROCHLORIDE 2 G/50ML
2 INJECTION, SOLUTION INTRAVENOUS ONCE
Status: COMPLETED | OUTPATIENT
Start: 2023-04-28 | End: 2023-04-28

## 2023-04-28 RX ORDER — SODIUM CHLORIDE 0.9 % (FLUSH) 0.9 %
10 SYRINGE (ML) INJECTION AS NEEDED
Status: DISCONTINUED | OUTPATIENT
Start: 2023-04-28 | End: 2023-05-03 | Stop reason: HOSPADM

## 2023-04-28 RX ADMIN — VANCOMYCIN HYDROCHLORIDE 1750 MG: 5 INJECTION, POWDER, LYOPHILIZED, FOR SOLUTION INTRAVENOUS at 22:12

## 2023-04-28 RX ADMIN — CEFEPIME HYDROCHLORIDE 2 G: 2 INJECTION, SOLUTION INTRAVENOUS at 21:19

## 2023-04-29 ENCOUNTER — APPOINTMENT (OUTPATIENT)
Dept: CT IMAGING | Facility: HOSPITAL | Age: 63
DRG: 638 | End: 2023-04-29
Payer: COMMERCIAL

## 2023-04-29 PROBLEM — L03.119 CELLULITIS IN DIABETIC FOOT: Status: ACTIVE | Noted: 2023-04-29

## 2023-04-29 PROBLEM — E11.9 T2DM (TYPE 2 DIABETES MELLITUS): Status: ACTIVE | Noted: 2023-04-29

## 2023-04-29 PROBLEM — E03.9 HYPOTHYROID: Status: ACTIVE | Noted: 2023-04-29

## 2023-04-29 PROBLEM — E78.5 DYSLIPIDEMIA: Status: ACTIVE | Noted: 2023-04-29

## 2023-04-29 PROBLEM — E11.628 CELLULITIS IN DIABETIC FOOT: Status: ACTIVE | Noted: 2023-04-29

## 2023-04-29 PROBLEM — E11.40 DIABETES MELLITUS WITH NEUROPATHY: Status: ACTIVE | Noted: 2023-04-29

## 2023-04-29 LAB
ANION GAP SERPL CALCULATED.3IONS-SCNC: 8.1 MMOL/L (ref 5–15)
BASOPHILS # BLD AUTO: 0.03 10*3/MM3 (ref 0–0.2)
BASOPHILS NFR BLD AUTO: 0.2 % (ref 0–1.5)
BUN SERPL-MCNC: 12 MG/DL (ref 8–23)
BUN/CREAT SERPL: 16.2 (ref 7–25)
CALCIUM SPEC-SCNC: 8.4 MG/DL (ref 8.6–10.5)
CHLORIDE SERPL-SCNC: 98 MMOL/L (ref 98–107)
CO2 SERPL-SCNC: 23.9 MMOL/L (ref 22–29)
CREAT SERPL-MCNC: 0.74 MG/DL (ref 0.76–1.27)
DEPRECATED RDW RBC AUTO: 36.3 FL (ref 37–54)
EGFRCR SERPLBLD CKD-EPI 2021: 103.7 ML/MIN/1.73
EOSINOPHIL # BLD AUTO: 0.11 10*3/MM3 (ref 0–0.4)
EOSINOPHIL NFR BLD AUTO: 0.8 % (ref 0.3–6.2)
ERYTHROCYTE [DISTWIDTH] IN BLOOD BY AUTOMATED COUNT: 12.1 % (ref 12.3–15.4)
GLUCOSE BLDC GLUCOMTR-MCNC: 212 MG/DL (ref 70–130)
GLUCOSE BLDC GLUCOMTR-MCNC: 218 MG/DL (ref 70–130)
GLUCOSE BLDC GLUCOMTR-MCNC: 255 MG/DL (ref 70–130)
GLUCOSE BLDC GLUCOMTR-MCNC: 295 MG/DL (ref 70–130)
GLUCOSE SERPL-MCNC: 285 MG/DL (ref 65–99)
HBA1C MFR BLD: 10.7 % (ref 4.8–5.6)
HCT VFR BLD AUTO: 40.2 % (ref 37.5–51)
HGB BLD-MCNC: 13.6 G/DL (ref 13–17.7)
IMM GRANULOCYTES # BLD AUTO: 0.06 10*3/MM3 (ref 0–0.05)
IMM GRANULOCYTES NFR BLD AUTO: 0.4 % (ref 0–0.5)
LYMPHOCYTES # BLD AUTO: 2.09 10*3/MM3 (ref 0.7–3.1)
LYMPHOCYTES NFR BLD AUTO: 14.6 % (ref 19.6–45.3)
MCH RBC QN AUTO: 27.8 PG (ref 26.6–33)
MCHC RBC AUTO-ENTMCNC: 33.8 G/DL (ref 31.5–35.7)
MCV RBC AUTO: 82.2 FL (ref 79–97)
MONOCYTES # BLD AUTO: 1.4 10*3/MM3 (ref 0.1–0.9)
MONOCYTES NFR BLD AUTO: 9.8 % (ref 5–12)
MRSA DNA SPEC QL NAA+PROBE: NORMAL
NEUTROPHILS NFR BLD AUTO: 10.62 10*3/MM3 (ref 1.7–7)
NEUTROPHILS NFR BLD AUTO: 74.2 % (ref 42.7–76)
NRBC BLD AUTO-RTO: 0 /100 WBC (ref 0–0.2)
PLATELET # BLD AUTO: 239 10*3/MM3 (ref 140–450)
PMV BLD AUTO: 9.8 FL (ref 6–12)
POTASSIUM SERPL-SCNC: 3.9 MMOL/L (ref 3.5–5.2)
RBC # BLD AUTO: 4.89 10*6/MM3 (ref 4.14–5.8)
SODIUM SERPL-SCNC: 130 MMOL/L (ref 136–145)
TSH SERPL DL<=0.05 MIU/L-ACNC: 2.61 UIU/ML (ref 0.27–4.2)
VANCOMYCIN SERPL-MCNC: 8.2 MCG/ML (ref 5–40)
WBC NRBC COR # BLD: 14.31 10*3/MM3 (ref 3.4–10.8)

## 2023-04-29 PROCEDURE — 99223 1ST HOSP IP/OBS HIGH 75: CPT | Performed by: STUDENT IN AN ORGANIZED HEALTH CARE EDUCATION/TRAINING PROGRAM

## 2023-04-29 PROCEDURE — 63710000001 INSULIN ASPART PER 5 UNITS: Performed by: STUDENT IN AN ORGANIZED HEALTH CARE EDUCATION/TRAINING PROGRAM

## 2023-04-29 PROCEDURE — 25010000002 VANCOMYCIN 1 G RECONSTITUTED SOLUTION: Performed by: STUDENT IN AN ORGANIZED HEALTH CARE EDUCATION/TRAINING PROGRAM

## 2023-04-29 PROCEDURE — 82948 REAGENT STRIP/BLOOD GLUCOSE: CPT

## 2023-04-29 PROCEDURE — 73700 CT LOWER EXTREMITY W/O DYE: CPT

## 2023-04-29 PROCEDURE — 80202 ASSAY OF VANCOMYCIN: CPT | Performed by: STUDENT IN AN ORGANIZED HEALTH CARE EDUCATION/TRAINING PROGRAM

## 2023-04-29 PROCEDURE — 25010000002 VANCOMYCIN 5 G RECONSTITUTED SOLUTION: Performed by: STUDENT IN AN ORGANIZED HEALTH CARE EDUCATION/TRAINING PROGRAM

## 2023-04-29 PROCEDURE — 85025 COMPLETE CBC W/AUTO DIFF WBC: CPT | Performed by: STUDENT IN AN ORGANIZED HEALTH CARE EDUCATION/TRAINING PROGRAM

## 2023-04-29 PROCEDURE — 80048 BASIC METABOLIC PNL TOTAL CA: CPT | Performed by: STUDENT IN AN ORGANIZED HEALTH CARE EDUCATION/TRAINING PROGRAM

## 2023-04-29 PROCEDURE — 87641 MR-STAPH DNA AMP PROBE: CPT | Performed by: STUDENT IN AN ORGANIZED HEALTH CARE EDUCATION/TRAINING PROGRAM

## 2023-04-29 PROCEDURE — 25010000002 PIPERACILLIN SOD-TAZOBACTAM PER 1 G: Performed by: STUDENT IN AN ORGANIZED HEALTH CARE EDUCATION/TRAINING PROGRAM

## 2023-04-29 PROCEDURE — 63710000001 INSULIN DETEMIR PER 5 UNITS: Performed by: INTERNAL MEDICINE

## 2023-04-29 RX ORDER — DEXTROSE MONOHYDRATE 25 G/50ML
25 INJECTION, SOLUTION INTRAVENOUS
Status: DISCONTINUED | OUTPATIENT
Start: 2023-04-29 | End: 2023-05-03 | Stop reason: HOSPADM

## 2023-04-29 RX ORDER — ACETAMINOPHEN 650 MG/1
650 SUPPOSITORY RECTAL EVERY 4 HOURS PRN
Status: DISCONTINUED | OUTPATIENT
Start: 2023-04-29 | End: 2023-05-03 | Stop reason: HOSPADM

## 2023-04-29 RX ORDER — ACETAMINOPHEN 160 MG/5ML
650 SOLUTION ORAL EVERY 4 HOURS PRN
Status: DISCONTINUED | OUTPATIENT
Start: 2023-04-29 | End: 2023-05-03 | Stop reason: HOSPADM

## 2023-04-29 RX ORDER — NICOTINE POLACRILEX 4 MG
15 LOZENGE BUCCAL
Status: DISCONTINUED | OUTPATIENT
Start: 2023-04-29 | End: 2023-05-03 | Stop reason: HOSPADM

## 2023-04-29 RX ORDER — ACETAMINOPHEN 325 MG/1
650 TABLET ORAL EVERY 4 HOURS PRN
Status: DISCONTINUED | OUTPATIENT
Start: 2023-04-29 | End: 2023-05-03 | Stop reason: HOSPADM

## 2023-04-29 RX ORDER — INSULIN ASPART 100 [IU]/ML
0-9 INJECTION, SOLUTION INTRAVENOUS; SUBCUTANEOUS
Status: DISCONTINUED | OUTPATIENT
Start: 2023-04-29 | End: 2023-05-03 | Stop reason: HOSPADM

## 2023-04-29 RX ORDER — SODIUM CHLORIDE 0.9 % (FLUSH) 0.9 %
10 SYRINGE (ML) INJECTION AS NEEDED
Status: DISCONTINUED | OUTPATIENT
Start: 2023-04-29 | End: 2023-05-03 | Stop reason: HOSPADM

## 2023-04-29 RX ORDER — ONDANSETRON 2 MG/ML
4 INJECTION INTRAMUSCULAR; INTRAVENOUS EVERY 6 HOURS PRN
Status: DISCONTINUED | OUTPATIENT
Start: 2023-04-29 | End: 2023-05-03 | Stop reason: HOSPADM

## 2023-04-29 RX ORDER — SODIUM CHLORIDE 9 MG/ML
40 INJECTION, SOLUTION INTRAVENOUS AS NEEDED
Status: DISCONTINUED | OUTPATIENT
Start: 2023-04-29 | End: 2023-05-03 | Stop reason: HOSPADM

## 2023-04-29 RX ORDER — LEVOTHYROXINE SODIUM 0.07 MG/1
75 TABLET ORAL
Status: DISCONTINUED | OUTPATIENT
Start: 2023-04-29 | End: 2023-05-01

## 2023-04-29 RX ORDER — SODIUM CHLORIDE 0.9 % (FLUSH) 0.9 %
10 SYRINGE (ML) INJECTION EVERY 12 HOURS SCHEDULED
Status: DISCONTINUED | OUTPATIENT
Start: 2023-04-29 | End: 2023-05-03 | Stop reason: HOSPADM

## 2023-04-29 RX ORDER — ATORVASTATIN CALCIUM 40 MG/1
40 TABLET, FILM COATED ORAL DAILY
Status: DISCONTINUED | OUTPATIENT
Start: 2023-04-29 | End: 2023-05-03 | Stop reason: HOSPADM

## 2023-04-29 RX ADMIN — VANCOMYCIN HYDROCHLORIDE 1250 MG: 5 INJECTION, POWDER, LYOPHILIZED, FOR SOLUTION INTRAVENOUS at 20:32

## 2023-04-29 RX ADMIN — TAZOBACTAM SODIUM AND PIPERACILLIN SODIUM 3.38 G: 375; 3 INJECTION, SOLUTION INTRAVENOUS at 02:10

## 2023-04-29 RX ADMIN — Medication 10 ML: at 20:33

## 2023-04-29 RX ADMIN — Medication 10 ML: at 02:11

## 2023-04-29 RX ADMIN — Medication 10 ML: at 08:02

## 2023-04-29 RX ADMIN — INSULIN DETEMIR 25 UNITS: 100 INJECTION, SOLUTION SUBCUTANEOUS at 20:32

## 2023-04-29 RX ADMIN — INSULIN ASPART 4 UNITS: 100 INJECTION, SOLUTION INTRAVENOUS; SUBCUTANEOUS at 17:52

## 2023-04-29 RX ADMIN — INSULIN ASPART 6 UNITS: 100 INJECTION, SOLUTION INTRAVENOUS; SUBCUTANEOUS at 06:41

## 2023-04-29 RX ADMIN — LEVOTHYROXINE SODIUM 75 MCG: 0.07 TABLET ORAL at 05:52

## 2023-04-29 RX ADMIN — INSULIN ASPART 4 UNITS: 100 INJECTION, SOLUTION INTRAVENOUS; SUBCUTANEOUS at 11:29

## 2023-04-29 RX ADMIN — SODIUM CHLORIDE 1000 MG: 900 INJECTION, SOLUTION INTRAVENOUS at 08:01

## 2023-04-29 RX ADMIN — ATORVASTATIN CALCIUM 40 MG: 40 TABLET, FILM COATED ORAL at 08:02

## 2023-04-29 RX ADMIN — TAZOBACTAM SODIUM AND PIPERACILLIN SODIUM 3.38 G: 375; 3 INJECTION, SOLUTION INTRAVENOUS at 17:05

## 2023-04-29 RX ADMIN — TAZOBACTAM SODIUM AND PIPERACILLIN SODIUM 3.38 G: 375; 3 INJECTION, SOLUTION INTRAVENOUS at 10:24

## 2023-04-29 NOTE — PLAN OF CARE
Goal Outcome Evaluation:  Plan of Care Reviewed With: patient        Progress: improving  Outcome Evaluation: VSS.

## 2023-04-29 NOTE — PROGRESS NOTES
Pharmacy Consult-Vancomycin Dosing    Seb Pettit is a  62 y.o. male receiving vancomycin therapy.     Indication: Bone and/or Joint Infection  Consulting Provider: Kerley    Goal AUC: 400-600 mg/:L*hr    Current Antimicrobial Therapy  Anti-Infectives (From admission, onward)      Ordered     Dose/Rate Route Frequency Start Stop    04/29/23 0836  vancomycin 1250 mg/250 mL 0.9% NS IVPB ([facility])        Ordering Provider: Kerley, Brian Joseph, DO    1,250 mg  over 75 Minutes Intravenous Every 12 Hours 04/29/23 1700 05/06/23 0459    04/29/23 0206  piperacillin-tazobactam (ZOSYN) 3.375 g in iso-osmotic dextrose 50 ml (premix)        Ordering Provider: Kerley, Brian Joseph, DO    3.375 g  over 4 Hours Intravenous Every 8 Hours 04/29/23 0900 05/06/23 0859    04/29/23 0206  piperacillin-tazobactam (ZOSYN) 3.375 g in iso-osmotic dextrose 50 ml (premix)        Ordering Provider: Kerley, Brian Joseph, DO    3.375 g  over 30 Minutes Intravenous Once 04/29/23 0300 04/29/23 0240    04/29/23 0149  Pharmacy to dose vancomycin        Ordering Provider: Kerley, Brian Joseph, DO     Does not apply Continuous PRN 04/29/23 0149 05/06/23 0148    04/29/23 0149  Pharmacy to Dose Zosyn        Ordering Provider: Kerley, Brian Joseph, DO     Does not apply Continuous PRN 04/29/23 0149 05/06/23 0148    04/28/23 2051  cefepime (MAXIPIME) IVPB 2 g/50ml D5W (premix)        Ordering Provider: Abhijit Monae APRN    2 g Intravenous Once 04/28/23 2053 04/28/23 2150    04/28/23 2051  vancomycin 1750 mg/500 mL 0.9% NS IVPB ([facility])        Ordering Provider: Abhijit Monae APRN    20 mg/kg × 90.7 kg Intravenous Once 04/28/23 2053 04/29/23 0030            Labs  Results from last 7 days   Lab Units 04/29/23  0617 04/28/23  1948   WBC 10*3/mm3 14.31* 17.05*   CREATININE mg/dL 0.74* 0.84      Estimated Creatinine Clearance: 136.8 mL/min (A) (by C-G formula based on SCr of 0.74 mg/dL (L)).  Temp Readings from Last 1 Encounters:   04/29/23 99.3 °F (37.4 °C)  "(Oral)       Microbiology Culture results  Microbiology Results (last 10 days)       Procedure Component Value - Date/Time    Blood Culture With JESSICA - Blood, Hand, Left [[id number]]  (Normal) Collected: 04/28/23 2048    Lab Status: Preliminary result Specimen: Blood from Hand, Left Updated: 04/29/23 0900     Blood Culture No growth at less than 24 hours    Blood Culture With JESSICA - Blood, Arm, Left [[id number]]  (Normal) Collected: 04/28/23 2042    Lab Status: Preliminary result Specimen: Blood from Arm, Left Updated: 04/29/23 0900     Blood Culture No growth at less than 24 hours    Wound Culture - Wound, Foot, Left [[id number]] Collected: 04/28/23 2039    Lab Status: Preliminary result Specimen: Wound from Foot, Left Updated: 04/28/23 2057     Gram Stain Rare (1+) Gram positive cocci in pairs            Evaluation of Dosing     Last Dose Received in the ED/Outside Facility: No  Is Patient on Dialysis or Renal Replacement: No    Ht - 188 cm (74\")  Wt - 91.1 kg (200 lb 13.4 oz)    Evaluation of Level    Results from last 7 days   Lab Units 04/29/23  0617   VANCOMYCIN RM mcg/mL 8.20                   [facility]ight[facility] AUC Calculation    Current AUC:   329 mg/L*hr    Predicted Steady State AUC on Current Dose: 383 mg/L*hr  _________________________________    Predicted Steady State AUC on New Dose:   475 mg/L*hr    Assessment/Plan    Pharmacy to dose vancomycin for bone and/or joint infection. Goal -600 mg/L*hr.  Vancomycin random level this morning of 8.2 mcg/mL is below the expected value and does not predict AUC target attainment. Will increase to Vancomycin 1250 mg IV Q12H at this time.  Assess clearance by vancomycin random level on 5/1 @ 0600.  Pharmacy will continue to monitor renal function, cultures and sensitivities, and clinical status to adjust regimen as necessary.    Thank you for the consult,    Reuben FisherD, BCPS   04/29/23 11:16 EDT  "

## 2023-04-29 NOTE — NURSING NOTE
Podiatry consult called. Nurse spoke with Dr. Cynthia Sorensen (Podiatry) regarding reasoning for consult. No new orders.

## 2023-04-29 NOTE — SIGNIFICANT NOTE
Patient's nurse (TAYLOR Quiroga) informed of patient's random fingerstick blood glucose result:  295.

## 2023-04-29 NOTE — PROGRESS NOTES
Pharmacy Consult-Vancomycin Dosing    Seb Pettit is a  62 y.o. male receiving vancomycin therapy.     Indication: bone and/or joint infection  Consulting Provider: Dr. Kerley    Goal AUC: 400-600 mg/:L*hr    Current Antimicrobial Therapy    Anti-Infectives (From admission, onward)      Ordered     Dose/Rate Route Frequency Start Stop    04/29/23 0206  piperacillin-tazobactam (ZOSYN) 3.375 g in iso-osmotic dextrose 50 ml (premix)        Ordering Provider: Kerley, Brian Joseph, DO    3.375 g  over 4 Hours Intravenous Every 8 Hours 04/29/23 0900 05/06/23 0859    04/29/23 0226  vancomycin 1 g/250 mL 0.9% NS (vial-mate)        Ordering Provider: Kerley, Brian Joseph, DO    1,000 mg  over 60 Minutes Intravenous Every 12 Hours 04/29/23 0800 05/06/23 0759    04/29/23 0206  piperacillin-tazobactam (ZOSYN) 3.375 g in iso-osmotic dextrose 50 ml (premix)        Ordering Provider: Kerley, Brian Joseph, DO    3.375 g  over 30 Minutes Intravenous Once 04/29/23 0300      04/29/23 0149  Pharmacy to dose vancomycin        Ordering Provider: Kerley, Brian Joseph, DO     Does not apply Continuous PRN 04/29/23 0149 05/06/23 0148    04/29/23 0149  Pharmacy to Dose Zosyn        Ordering Provider: Kerley, Brian Joseph, DO     Does not apply Continuous PRN 04/29/23 0149 05/06/23 0148    04/28/23 2051  cefepime (MAXIPIME) IVPB 2 g/50ml D5W (premix)        Ordering Provider: Abhijit Monae APRN    2 g Intravenous Once 04/28/23 2053 04/28/23 2150 04/28/23 2051  vancomycin 1750 mg/500 mL 0.9% NS IVPB ([facility])        Ordering Provider: Abhijit Monae APRN    20 mg/kg × 90.7 kg Intravenous Once 04/28/23 2053 04/29/23 0030            Labs    Results from last 7 days   Lab Units 04/28/23  1948   WBC 10*3/mm3 17.05*   CREATININE mg/dL 0.84      Estimated Creatinine Clearance: 120.5 mL/min (by C-G formula based on SCr of 0.84 mg/dL).    Temp Readings from Last 1 Encounters:   04/29/23 98.5 °F (36.9 °C) (Oral)       Microbiology Culture  "results  Microbiology Results (last 10 days)       Procedure Component Value - Date/Time    Wound Culture - Wound, Foot, Left [[id number]] Collected: 04/28/23 2039    Lab Status: Preliminary result Specimen: Wound from Foot, Left Updated: 04/28/23 2057     Gram Stain Rare (1+) Gram positive cocci in pairs            Evaluation of Dosing     Last Dose Received in the ED/Outside Facility: Vancomycin 1750 mg 4/28/23 @22:12  Is Patient on Dialysis or Renal Replacement: No    Ht - 188 cm (74\")  Wt - 91.1 kg (200 lb 13.4 oz)    Evaluation of Level                      [facility] AUC Calculation    Current AUC:   new start    Predicted Steady State AUC on Current Dose: 442 mg/L*hr  _________________________________    Predicted Steady State AUC on New Dose:   N/A    Assessment/Plan    Pharmacy to dose vancomycin for bone and/or joint infection. Goal -600 mg/L*hr.  Patient received loading dose of vancomycin 1750 mg (~ 19.2 mg/kg) IV on 4/28 @ 22:12. Initiate maintenance dose of vancomycin 1000 mg (~ 11 mg/kg) IV Q 12 hr on 4/29 @ 0800.  Assess clearance by vancomycin random level on 4/30 @ 0600.  Pharmacy will continue to monitor renal function, cultures and sensitivities, and clinical status to adjust regimen as necessary.      Thank you,    Selina Riggins RPH,PharmD  04/29/23 02:38 EDT                     "

## 2023-04-29 NOTE — ED PROVIDER NOTES
"Subjective  History of Present Illness:    Patient is a 62-year-old male here today with high blood sugar and left foot pain.  He is companied by his wife helps provide history.  Patient states that over the past couple days he has had an elevated blood sugar, as high as 400 at home.  He is noted that his left foot has become progressively more reddened and swollen for the past 2 to 3 days.  He is followed by a podiatrist for a diabetic foot ulcer to the left foot and recently started taking antibiotics this week.  He states after approximately 2 days of the antibiotics he feels that the foot and second toe appear worse.  He has a history of diabetes and neuropathy.  Also has a history of hypothyroidism.  Patient has noted a fever at home, but denies any chest pain or shortness of breath.  He has not noted any swelling to his left lower leg or ankle, but notes that the his skin feels hotter.    Nurses Notes reviewed and agree, including vitals, allergies, social history and prior medical history.     REVIEW OF SYSTEMS: All systems reviewed and not pertinent unless noted.  Review of Systems    Past Medical History:   Diagnosis Date   • Diabetes mellitus        Allergies:    Patient has no known allergies.      Past Surgical History:   Procedure Laterality Date   • COLONOSCOPY     • ENDOSCOPY           Social History     Socioeconomic History   • Marital status:    Tobacco Use   • Smoking status: Never   • Smokeless tobacco: Never   Vaping Use   • Vaping Use: Never used   Substance and Sexual Activity   • Alcohol use: Never   • Drug use: Never   • Sexual activity: Defer         No family history on file.    Objective  Physical Exam:  /95   Pulse 105   Temp 98.5 °F (36.9 °C) (Oral)   Resp 16   Ht 185.4 cm (73\")   Wt 90.7 kg (200 lb)   SpO2 91%   BMI 26.39 kg/m²      Physical Exam  Vitals and nursing note reviewed.   Constitutional:       Appearance: Normal appearance. He is normal weight.   HENT:    "   Head: Normocephalic and atraumatic.   Cardiovascular:      Rate and Rhythm: Normal rate and regular rhythm.      Pulses: Normal pulses.      Heart sounds: Normal heart sounds.   Pulmonary:      Effort: Pulmonary effort is normal.      Breath sounds: Normal breath sounds.   Abdominal:      General: Abdomen is flat. Bowel sounds are normal. There is no distension.      Palpations: Abdomen is soft.      Tenderness: There is no abdominal tenderness.   Musculoskeletal:      Right lower leg: No edema.      Left lower leg: No edema.        Feet:    Feet:      Left foot:      Skin integrity: Ulcer and erythema present.   Skin:     General: Skin is warm and dry.      Capillary Refill: Capillary refill takes less than 2 seconds.   Neurological:      General: No focal deficit present.      Mental Status: He is alert and oriented to person, place, and time.   Psychiatric:         Mood and Affect: Mood normal.         Behavior: Behavior normal.         Procedures    ED Course:         Lab Results (last 24 hours)     Procedure Component Value Units Date/Time    Lactic Acid, Plasma [[id number]]  (Normal) Collected: 04/28/23 1940    Specimen: Blood Updated: 04/28/23 2043     Lactate 1.7 mmol/L     POC Glucose Once [[id number]]  (Abnormal) Collected: 04/28/23 1944    Specimen: Blood Updated: 04/28/23 1952     Glucose 285 mg/dL      Comment: Serial Number: [id number]:  [email or web address]       CBC & Differential [[id number]]  (Abnormal) Collected: 04/28/23 1948    Specimen: Blood Updated: 04/28/23 2027    Narrative:      The following orders were created for panel order CBC & Differential.  Procedure                               Abnormality         Status                     ---------                               -----------         ------                     CBC Auto Differential[[id number]]        Abnormal            Final result                 Please view results for these tests on the individual orders.    Comprehensive Metabolic  "Panel [[id number]]  (Abnormal) Collected: 04/28/23 1948    Specimen: Blood Updated: 04/28/23 2102     Glucose 320 mg/dL      Comment: Glucose >180, Hemoglobin A1C recommended.        BUN 17 mg/dL      Creatinine 0.84 mg/dL      Sodium 132 mmol/L      Potassium 3.6 mmol/L      Chloride 96 mmol/L      CO2 23.0 mmol/L      Calcium 8.9 mg/dL      Total Protein 8.0 g/dL      Albumin 3.6 g/dL      ALT (SGPT) 17 U/L      AST (SGOT) 16 U/L      Alkaline Phosphatase 130 U/L      Total Bilirubin 0.9 mg/dL      Globulin 4.4 gm/dL      A/G Ratio 0.8 g/dL      BUN/Creatinine Ratio 20.2     Anion Gap 13.0 mmol/L      eGFR 99.8 mL/min/1.73     Narrative:      GFR Normal >60  Chronic Kidney Disease <60  Kidney Failure <15      Sedimentation Rate [[id number]]  (Abnormal) Collected: 04/28/23 1948    Specimen: Blood Updated: 04/28/23 2030     Sed Rate 64 mm/hr     C-reactive Protein [[id number]]  (Abnormal) Collected: 04/28/23 1948    Specimen: Blood Updated: 04/28/23 2102     C-Reactive Protein 13.21 mg/dL     Procalcitonin [[id number]]  (Normal) Collected: 04/28/23 1948    Specimen: Blood Updated: 04/28/23 2049     Procalcitonin 0.23 ng/mL     Narrative:      As a Marker for Sepsis (Non-Neonates):    1. <0.5 ng/mL represents a low risk of severe sepsis and/or septic shock.  2. >2 ng/mL represents a high risk of severe sepsis and/or septic shock.    As a Marker for Lower Respiratory Tract Infections that require antibiotic therapy:    PCT on Admission    Antibiotic Therapy       6-12 Hrs later    >0.5                Strongly Recommended  >0.25 - <0.5        Recommended   0.1 - 0.25          Discouraged              Remeasure/reassess PCT  <0.1                Strongly Discouraged     Remeasure/reassess PCT    As 28 day mortality risk marker: \"Change in Procalcitonin Result\" (>80% or <=80%) if Day 0 (or Day 1) and Day 4 values are available. Refer to http://www.[employer]-pct-calculator.com    Change in PCT <=80%  A decrease of PCT levels " below or equal to 80% defines a positive change in PCT test result representing a higher risk for 28-day all-cause mortality of patients diagnosed with severe sepsis for septic shock.    Change in PCT >80%  A decrease of PCT levels of more than 80% defines a negative change in PCT result representing a lower risk for 28-day all-cause mortality of patients diagnosed with severe sepsis or septic shock.       CBC Auto Differential [[id number]]  (Abnormal) Collected: 04/28/23 1948    Specimen: Blood Updated: 04/28/23 2027     WBC 17.05 10*3/mm3      RBC 5.15 10*6/mm3      Hemoglobin 14.5 g/dL      Hematocrit 41.6 %      MCV 80.8 fL      MCH 28.2 pg      MCHC 34.9 g/dL      RDW 12.1 %      RDW-SD 35.3 fl      MPV 10.4 fL      Platelets 257 10*3/mm3      Neutrophil % 75.3 %      Lymphocyte % 13.3 %      Monocyte % 10.4 %      Eosinophil % 0.2 %      Basophil % 0.3 %      Immature Grans % 0.5 %      Neutrophils, Absolute 12.83 10*3/mm3      Lymphocytes, Absolute 2.27 10*3/mm3      Monocytes, Absolute 1.78 10*3/mm3      Eosinophils, Absolute 0.04 10*3/mm3      Basophils, Absolute 0.05 10*3/mm3      Immature Grans, Absolute 0.08 10*3/mm3      nRBC 0.0 /100 WBC     Wound Culture - Wound, Foot, Left [[id number]] Collected: 04/28/23 2039    Specimen: Wound from Foot, Left Updated: 04/28/23 2057     Gram Stain Rare (1+) Gram positive cocci in pairs    Blood Culture With JESSICA - Blood, Arm, Left [[id number]] Collected: 04/28/23 2042    Specimen: Blood from Arm, Left Updated: 04/28/23 2056    Blood Culture With JESSICA - Blood, Hand, Left [[id number]] Collected: 04/28/23 2048    Specimen: Blood from Hand, Left Updated: 04/28/23 2056           No radiology results from the last 24 hrs       MDM    Initial impression of presenting illness: Hyperglycemia, left foot pain and erythema    DDX: includes but is not limited to: Sepsis, osteomyelitis, cellulitis, infected diabetic ulcer    Patient arrives hemodynamically stable, afebrile and  normotensive, with vitals interpreted by myself.     Pertinent features from physical exam: Erythematous left foot from the second and third digit to the midfoot.  Noted swelling to the second digit with skin discoloration and surrounding tissue underneath the toe is macerated.  Drainage noted within the webbings.  Increased skin temperature noted up to left calf.    Initial diagnostic plan: CBC, CMP, procalcitonin, lactic acid, sed rate, CRP, wound culture, blood cultures, and x-ray of the left foot    Results from initial plan were reviewed and interpreted by me revealing white blood count of 17.05.  Inflammatory markers show a CRP of 13.21 and a sed rate of 64.  His procalcitonin and lactate are normal.  Glucose is elevated to 320, but he has normal liver and kidney function.  Blood cultures are pending.  Wound culture shows preliminary results of rare gram-positive cocci in pairs.  No acute osteomyelitis noted on left foot, reviewed with Dr. Grove.    Diagnostic information from other sources: Medical record    Interventions / Re-evaluation: Patient was given cefepime and vancomycin to treat his cellulitis.  During his visit he did not have any episodes of a fever or hypotension.  Patient appears comfortable in the stretcher waiting for his disposition.    Results/clinical rationale were discussed with Dr. Grove.    Consultations/Discussion of results with other physicians: Initially spoke with Dr. Kerley to have the patient admitted here for failed outpatient treatment.  Unfortunately, do not have podiatry on-call in the evening or on weekends.    [facility] in [place] notified first, no beds available per  is in the waiting list could extend until Monday.  Patient was placed on the waiting list at this time.    [facility] notified Nex and I spoke with Dr. Horan who states that they also have a long wait list for admissions and recommended having the patient follow-up  with the podiatrist here when they are available.    [facility] was notified last, however, unable to speak with a hospitalist at this time.    Spoke again with Dr. Kerley about the calls above. Will come evaluate the patient and admit here waiting for podiatry consult when able.    Disposition plan: Patient admitted to the hospital for further care and management.  -----    Final diagnoses:   Cellulitis in diabetic foot        Abhijit Monae, APRN  04/29/23 0139

## 2023-04-29 NOTE — PROGRESS NOTES
"Adult Nutrition  Assessment/PES    Patient Name:  Seb Pettit  YOB: 1960  MRN: [id number]  Admit Date:  4/28/2023    Assessment Date:  4/29/2023    Comments:    Screened for DM dx, wound. Pt with PMHx significant for T2DM, peripheral neuropathy, GERD, hypothyroidism presented to [facility] 4/28 with c/o L foot pain and swelling. Pt with diabetic ulcer to the L foot, cellulitis. Glucose 285 mg/dL, A1c 10.7%.  lbs, overweight for age per BMI 25.79. No recent weight changes indicated per nutrition screen. Currently receiving a cardiac, diabetic diet. No PO intakes yet reported. RD to order Prosource BID to promote wound healing. Will monitor and F/U. Available PRN.      Reason for Assessment     Row Name 04/29/23 0848          Reason for Assessment    Reason For Assessment diagnosis/disease state;identified at risk by screening criteria     Diagnosis diabetes diagnosis/complications     Identified At Risk by Screening Criteria large or nonhealing wound, burn or pressure injury                  Labs/Tests/Procedures/Meds     Row Name 04/29/23 0848          Labs/Procedures/Meds    Lab Results Reviewed reviewed, pertinent     Lab Results Comments Low: Na+, Cr; High: Glu, A1c        Medications    Pertinent Medications Reviewed reviewed, pertinent     Pertinent Medications Comments lipitor, insulin, synthroid, NaCl                Physical Findings     Row Name 04/29/23 0849          Physical Findings    Overall Physical Appearance overweight, L foot DM ulcer, Yvon score 22                Estimated/Assessed Needs - Anthropometrics     Row Name 04/29/23 0850 04/29/23 0158       Anthropometrics    Height -- 188 cm (74\")    Weight -- 91.1 kg (200 lb 13.4 oz)    Height for Calculation 1.88 m (6' 2\") --    Weight for Calculation 91 kg (200 lb 9.9 oz)  CBW --       Estimated/Assessed Needs    Additional Documentation Fluid Requirements (Group);KCAL/KG (Group);Protein Requirements (Group);Estimated Calorie " Needs (Group) --       Estimated Calorie Needs    Estimated Calorie Requirement (kcal/day) 2,730 --    Estimated Calorie Need Method kcal/kg --       KCAL/KG    KCAL/KG 30 Kcal/Kg (kcal) --    30 Kcal/Kg (kcal) 2730 --       Protein Requirements    Weight Used For Protein Calculations 91 kg (200 lb 9.9 oz)  CBW --    Est Protein Requirement Amount (gms/kg) 1.2 gm protein --    Estimated Protein Requirements (gms/day) 109.2 --       Fluid Requirements    Fluid Requirements (mL/day) 2730  1 mL/kcal --    RDA Method (mL) 2730 --               Nutrition Prescription Ordered     Row Name 04/29/23 0856          Nutrition Prescription PO    Current PO Diet Regular     Fluid Consistency Thin     Common Modifiers Cardiac;Consistent Carbohydrate                Evaluation of Received Nutrient/Fluid Intake     Row Name 04/29/23 0856          PO Evaluation    Number of Days PO Intake Evaluated Insufficient Data                   Problem/Interventions:   Problem 1     Row Name 04/29/23 0856          Nutrition Diagnoses Problem 1    Problem 1 Increased Nutrient Needs     Macronutrient Protein;Kcal     Micronutrient Vitamin;Mineral     Etiology (related to) Medical Diagnosis     Signs/Symptoms (evidenced by) Report/Observation  L foot DM ulcer                Problem 2     Row Name 04/29/23 0857          Nutrition Diagnoses Problem 2    Problem 2 Altered Nutrition Related to Labs     Etiology (related to) Medical Diagnosis     Endocrine DM     Signs/Symptoms (evidenced by) Biochemical     Specific Labs Noted HgbA1C;Glucose                    Intervention Goal     Row Name 04/29/23 0857          Intervention Goal    General Maintain nutrition;Meet nutritional needs for age/condition;Disease management/therapy;Improved nutrition related lab(s)     PO Establish PO;Tolerate PO;Meet estimated needs     Weight Maintain weight                Nutrition Intervention     Row Name 04/29/23 0857          Nutrition Intervention    RD/Tech Action  Follow Tx progress;Encourage intake;Care plan reviewd;Recommend/ordered     Recommended/Ordered Supplement;Snack                Nutrition Prescription     Row Name 04/29/23 0857          Nutrition Prescription PO    PO Prescription Begin/change supplement     Supplement PRO liquid     Supplement Frequency 2 times a day     New PO Prescription Ordered? Yes        Other Orders    Obtain Weight Daily     Obtain Weight Ordered? No, recommended     Supplement Vitamin mineral supplement     Supplement Ordered? No, recommended     Labs K+;Phos;Mg++;Na+     Labs Ordered? No, recommended                Education/Evaluation     Row Name 04/29/23 0859          Education    Education Will Instruct as appropriate        Monitor/Evaluation    Monitor Per protocol;Skin status;Supplement intake;PO intake;Weight;Symptoms;Pertinent labs                 Electronically signed by:  Anh Low RD  04/29/23 08:59 EDT

## 2023-04-29 NOTE — H&P
[facility] HOSPITALIST   HISTORY AND PHYSICAL      Name:  Seb Pettit   Age:  62 y.o.  Sex:  male  :  1960  MRN:  [id number]   Visit Number:  [id number]  Admission Date:  2023  Date Of Service:  23  Primary Care Physician:  Amado Phillips PA    Chief Complaint:     Left foot pain and swelling    History Of Presenting Illness:      Patient is a 62-year-old man with past medical history of type 2 diabetes, dyslipidemia, hypothyroidism, GERD, peripheral neuropathy.  Presented to [facility] ED on 2023 with concern for high blood sugar and left foot pain.  Said that about 6 weeks prior he stepped on a piece of glass and has had a foot ulcer since then, has been following with podiatry in [place].  He has had blood sugars in the in the past couple days, left foot has become progressively more red and swelling during that time.  He does follow with podiatry for diabetic foot ulcer of the left foot, recently started taking antibiotics a couple days prior.  Starting Friday morning , did report fever and worsening swelling in his foot prompting him to come to the ED.  Denied shortness of air, chest pain.    ED summary: Afebrile, vital signs stable on room air.  Blood glucose 320, sodium 132.  CRP 13.  Procalcitonin negative.  Leukocytosis 17.  Sed rate 64.  Blood cultures collected.  Wound culture collected.  XR left foot per ED interpretation soft tissue swelling noted to second toe, no evidence of osteomyelitis.  He was provided vancomycin and cefepime.  With no podiatry coverage until , ED did attempt to call other hospitals, no immediate availability and would likely be on prolonged wait list.    Review Of Systems:    All systems were reviewed and negative except as mentioned in history of presenting illness, assessment and plan.    Past Medical History: Patient  has a past medical history of Diabetes mellitus.    Past Surgical History: Patient  has a past surgical history  that includes Esophagogastroduodenoscopy and Colonoscopy.    Social History: Patient  reports that he has never smoked. He has never used smokeless tobacco. He reports that he does not drink alcohol and does not use drugs.    Family History:  Patient's family history has been reviewed and found to be noncontributory.     Allergies:      Patient has no known allergies.    Home Medications:    Prior to Admission Medications     Prescriptions Last Dose Informant Patient Reported? Taking?    acetaminophen (Tylenol 8 Hour) 650 MG 8 hr tablet   No No    Take 1 tablet by mouth Every 8 (Eight) Hours As Needed for Mild Pain .    atorvastatin (LIPITOR) 40 MG tablet   Yes No    TAKE 1 TABLET BY MOUTH AT BEDTIME- HOLD UNTIL NIACIN HAS BEEN FINISHED    bisacodyl (Dulcolax) 5 MG EC tablet   No No    Take 2 @ 3pm, 2 @ 7 pm day prior to colonoscopy    cyclobenzaprine (FLEXERIL) 5 MG tablet   No No    Take 1 tablet by mouth At Night As Needed for Muscle Spasms.    glimepiride (AMARYL) 1 MG tablet   Yes No    GNP Vitamin D Super Strength 125 MCG (5000 UT) tablet   Yes No    Take 1 tablet by mouth 2 (Two) Times a Day.    ibuprofen (ADVIL,MOTRIN) 600 MG tablet   No No    Take 1 tablet by mouth Every 8 (Eight) Hours As Needed for Mild Pain .    levothyroxine (SYNTHROID, LEVOTHROID) 75 MCG tablet   Yes No    Take 75 mcg by mouth Daily.    meloxicam (MOBIC) 15 MG tablet   Yes No    TAKE 1 TABLET BY MOUTH DAILY; FOR LEG PAIN    metFORMIN (GLUCOPHAGE) 1000 MG tablet   Yes No    omeprazole (priLOSEC) 20 MG capsule   Yes No    Take 20 mg by mouth 2 (Two) Times a Day.    ondansetron ODT (ZOFRAN-ODT) 4 MG disintegrating tablet   No No    Place 1 tablet on the tongue Every 8 (Eight) Hours As Needed for Nausea or Vomiting.        ED Medications:    Medications   sodium chloride 0.9 % flush 10 mL (has no administration in time range)   cefepime (MAXIPIME) IVPB 2 g/50ml D5W (premix) (0 g Intravenous Stopped 4/28/23 2150)   vancomycin 1750 mg/500 mL  "0.9% NS IVPB ([facility]) (1,750 mg Intravenous New Bag 4/28/23 2212)     Vital Signs:  Temp:  [98.5 °F (36.9 °C)] 98.5 °F (36.9 °C)  Heart Rate:  [105] 105  Resp:  [16] 16  BP: (137-141)/(90-95) 137/95        04/28/23  1920   Weight: 90.7 kg (200 lb)     Body mass index is 26.39 kg/m².    Physical Exam:     Most recent vital Signs: /95   Pulse 105   Temp 98.5 °F (36.9 °C) (Oral)   Resp 16   Ht 185.4 cm (73\")   Wt 90.7 kg (200 lb)   SpO2 91%   BMI 26.39 kg/m²     Physical Exam  Constitutional:       General: He is not in acute distress.     Appearance: He is not toxic-appearing.   HENT:      Mouth/Throat:      Mouth: Mucous membranes are moist.   Eyes:      Extraocular Movements: Extraocular movements intact.   Cardiovascular:      Rate and Rhythm: Normal rate and regular rhythm.      Pulses: Normal pulses.      Heart sounds: Normal heart sounds.   Pulmonary:      Effort: Pulmonary effort is normal.      Breath sounds: Normal breath sounds.   Abdominal:      Palpations: Abdomen is soft.      Tenderness: There is no abdominal tenderness.   Musculoskeletal:      Right lower leg: No edema.      Left lower leg: No edema.        Feet:    Feet:      Comments: Ulcer on plantar surface of foot without drainage, second toe left foot swelling and erythema with ulceration on lateral portion, maceration and intertriginous areas of left foot toes, erythema on dorsal foot  Skin:     General: Skin is warm.   Neurological:      General: No focal deficit present.      Mental Status: He is alert and oriented to person, place, and time.   Psychiatric:         Mood and Affect: Mood normal.         Thought Content: Thought content normal.         Laboratory data:    I have reviewed the labs done in the emergency room.    Results from last 7 days   Lab Units 04/28/23 1948   SODIUM mmol/L 132*   POTASSIUM mmol/L 3.6   CHLORIDE mmol/L 96*   CO2 mmol/L 23.0   BUN mg/dL 17   CREATININE mg/dL 0.84   CALCIUM mg/dL 8.9   BILIRUBIN mg/dL " 0.9   ALK PHOS U/L 130*   ALT (SGPT) U/L 17   AST (SGOT) U/L 16   GLUCOSE mg/dL 320*     Results from last 7 days   Lab Units 04/28/23 1948   WBC 10*3/mm3 17.05*   HEMOGLOBIN g/dL 14.5   HEMATOCRIT % 41.6   PLATELETS 10*3/mm3 257                                   Invalid input(s): USDES,  BLOODU, NITRITITE, BACT, EP    Pain Management Panel          View : No data to display.                      EKG:      None    Radiology:    No radiology results for the last 3 days    Assessment/Plan:    Inpatient general floor admission 4/29/2023 with cellulitis and diabetic foot ulcer of left foot, failed outpatient treatment.    Cellulitis, POA  Diabetic foot ulcer  Vancomycin and Zosyn started 4/29.  Blood cultures collected.  Wound culture collected.  No obvious sign of osteomyelitis on initial x-ray, radiology interpretation pending.  MRI left foot ordered.  Podiatry consultation, recommendations appreciated, will be able to see him as early as Monday 5/1.    Chronic:  type 2 diabetes, dyslipidemia, hypothyroidism, GERD, peripheral neuropathy.     Hold home diabetic medications.  Provide subcutaneous insulin protocol.  Continue other home medications.    Risk Assessment: High  DVT Prophylaxis: SCDs  Code Status: Full code  Diet: Heart healthy/carbohydrate controlled    Advance Care Planning   ACP discussion was held with the patient during this visit. Patient does not have an advance directive, declines further assistance.           Brian Joseph Kerley, DO  04/29/23  00:59 EDT    Dictated utilizing Dragon dictation.

## 2023-04-29 NOTE — PAYOR COMM NOTE
"  INPT NOTIFICATION AND CLINICALS  UR MANAGER; STEPHANIE DARNELL [phone] AND [phone]    Seb Lagunas (62 y.o. Male)     Date of Birth   1960    Social Security Number       Address   [address] [place] [address] [place] [address]    Home Phone   [phone]    MRN   [id number]       Confucianism   None    Marital Status                               Admission Date   23    Admission Type   Emergency    Admitting Provider   Kerley, Brian Joseph, DO    Attending Provider   Wenceslao Babb DO    Department, Room/Bed   [facility] TELEMETRY 3, 311/1       Discharge Date       Discharge Disposition       Discharge Destination                               Attending Provider: Wenceslao Babb DO    Allergies: No Known Allergies    Isolation: None   Infection: None   Code Status: CPR    Ht: 188 cm (74\")   Wt: 91.1 kg (200 lb 13.4 oz)    Admission Cmt: None   Principal Problem: Cellulitis in diabetic foot [E11.628,L03.119]                 Active Insurance as of 2023     Primary Coverage     Payor Plan Insurance Group Employer/Plan Group    [employer] [place] [facility] [employer] [facility] [place]      Payor Plan Address Payor Plan Phone Number Payor Plan Fax Number Effective Dates    [address]   2020 - None Entered    [facility] [phone]       Subscriber Name Subscriber Birth Date Member ID       SEB LAGUNAS 1960 [id number]                 Emergency Contacts      (Rel.) Home Phone Work Phone Mobile Phone    sy lagunas (Spouse) [phone] -- --               History & Physical      Kerley, Brian Joseph, DO at 23 0059            [facility] HOSPITALIST   HISTORY AND PHYSICAL      Name:  Seb Lagunas   Age:  62 y.o.  Sex:  male  :  1960  MRN:  [id number]   Visit Number:  [id number]  Admission Date:  2023  Date Of Service:  23  Primary Care Physician:  Amado Phillips PA    Chief Complaint:     Left foot " pain and swelling    History Of Presenting Illness:      Patient is a 62-year-old man with past medical history of type 2 diabetes, dyslipidemia, hypothyroidism, GERD, peripheral neuropathy.  Presented to [facility] ED on 4/28/2023 with concern for high blood sugar and left foot pain.  Said that about 6 weeks prior he stepped on a piece of glass and has had a foot ulcer since then, has been following with podiatry in [place].  He has had blood sugars in the in the past couple days, left foot has become progressively more red and swelling during that time.  He does follow with podiatry for diabetic foot ulcer of the left foot, recently started taking antibiotics a couple days prior.  Starting Friday morning 4/28, did report fever and worsening swelling in his foot prompting him to come to the ED.  Denied shortness of air, chest pain.    ED summary: Afebrile, vital signs stable on room air.  Blood glucose 320, sodium 132.  CRP 13.  Procalcitonin negative.  Leukocytosis 17.  Sed rate 64.  Blood cultures collected.  Wound culture collected.  XR left foot per ED interpretation soft tissue swelling noted to second toe, no evidence of osteomyelitis.  He was provided vancomycin and cefepime.  With no podiatry coverage until 5/1, ED did attempt to call other hospitals, no immediate availability and would likely be on prolonged wait list.    Review Of Systems:    All systems were reviewed and negative except as mentioned in history of presenting illness, assessment and plan.    Past Medical History: Patient  has a past medical history of Diabetes mellitus.    Past Surgical History: Patient  has a past surgical history that includes Esophagogastroduodenoscopy and Colonoscopy.    Social History: Patient  reports that he has never smoked. He has never used smokeless tobacco. He reports that he does not drink alcohol and does not use drugs.    Family History:  Patient's family history has been reviewed and found to be noncontributory.      Allergies:      Patient has no known allergies.    Home Medications:    Prior to Admission Medications     Prescriptions Last Dose Informant Patient Reported? Taking?    acetaminophen (Tylenol 8 Hour) 650 MG 8 hr tablet   No No    Take 1 tablet by mouth Every 8 (Eight) Hours As Needed for Mild Pain .    atorvastatin (LIPITOR) 40 MG tablet   Yes No    TAKE 1 TABLET BY MOUTH AT BEDTIME- HOLD UNTIL NIACIN HAS BEEN FINISHED    bisacodyl (Dulcolax) 5 MG EC tablet   No No    Take 2 @ 3pm, 2 @ 7 pm day prior to colonoscopy    cyclobenzaprine (FLEXERIL) 5 MG tablet   No No    Take 1 tablet by mouth At Night As Needed for Muscle Spasms.    glimepiride (AMARYL) 1 MG tablet   Yes No    GNP Vitamin D Super Strength 125 MCG (5000 UT) tablet   Yes No    Take 1 tablet by mouth 2 (Two) Times a Day.    ibuprofen (ADVIL,MOTRIN) 600 MG tablet   No No    Take 1 tablet by mouth Every 8 (Eight) Hours As Needed for Mild Pain .    levothyroxine (SYNTHROID, LEVOTHROID) 75 MCG tablet   Yes No    Take 75 mcg by mouth Daily.    meloxicam (MOBIC) 15 MG tablet   Yes No    TAKE 1 TABLET BY MOUTH DAILY; FOR LEG PAIN    metFORMIN (GLUCOPHAGE) 1000 MG tablet   Yes No    omeprazole (priLOSEC) 20 MG capsule   Yes No    Take 20 mg by mouth 2 (Two) Times a Day.    ondansetron ODT (ZOFRAN-ODT) 4 MG disintegrating tablet   No No    Place 1 tablet on the tongue Every 8 (Eight) Hours As Needed for Nausea or Vomiting.        ED Medications:    Medications   sodium chloride 0.9 % flush 10 mL (has no administration in time range)   cefepime (MAXIPIME) IVPB 2 g/50ml D5W (premix) (0 g Intravenous Stopped 4/28/23 2150)   vancomycin 1750 mg/500 mL 0.9% NS IVPB ([facility]) (1,750 mg Intravenous New Bag 4/28/23 2212)     Vital Signs:  Temp:  [98.5 °F (36.9 °C)] 98.5 °F (36.9 °C)  Heart Rate:  [105] 105  Resp:  [16] 16  BP: (137-141)/(90-95) 137/95        04/28/23  1920   Weight: 90.7 kg (200 lb)     Body mass index is 26.39 kg/m².    Physical Exam:     Most recent  "vital Signs: /95   Pulse 105   Temp 98.5 °F (36.9 °C) (Oral)   Resp 16   Ht 185.4 cm (73\")   Wt 90.7 kg (200 lb)   SpO2 91%   BMI 26.39 kg/m²     Physical Exam  Constitutional:       General: He is not in acute distress.     Appearance: He is not toxic-appearing.   HENT:      Mouth/Throat:      Mouth: Mucous membranes are moist.   Eyes:      Extraocular Movements: Extraocular movements intact.   Cardiovascular:      Rate and Rhythm: Normal rate and regular rhythm.      Pulses: Normal pulses.      Heart sounds: Normal heart sounds.   Pulmonary:      Effort: Pulmonary effort is normal.      Breath sounds: Normal breath sounds.   Abdominal:      Palpations: Abdomen is soft.      Tenderness: There is no abdominal tenderness.   Musculoskeletal:      Right lower leg: No edema.      Left lower leg: No edema.        Feet:    Feet:      Comments: Ulcer on plantar surface of foot without drainage, second toe left foot swelling and erythema with ulceration on lateral portion, maceration and intertriginous areas of left foot toes, erythema on dorsal foot  Skin:     General: Skin is warm.   Neurological:      General: No focal deficit present.      Mental Status: He is alert and oriented to person, place, and time.   Psychiatric:         Mood and Affect: Mood normal.         Thought Content: Thought content normal.         Laboratory data:    I have reviewed the labs done in the emergency room.    Results from last 7 days   Lab Units 04/28/23 1948   SODIUM mmol/L 132*   POTASSIUM mmol/L 3.6   CHLORIDE mmol/L 96*   CO2 mmol/L 23.0   BUN mg/dL 17   CREATININE mg/dL 0.84   CALCIUM mg/dL 8.9   BILIRUBIN mg/dL 0.9   ALK PHOS U/L 130*   ALT (SGPT) U/L 17   AST (SGOT) U/L 16   GLUCOSE mg/dL 320*     Results from last 7 days   Lab Units 04/28/23 1948   WBC 10*3/mm3 17.05*   HEMOGLOBIN g/dL 14.5   HEMATOCRIT % 41.6   PLATELETS 10*3/mm3 257                                   Invalid input(s): USDES,  BLOODU, NITRITITE, BACT, " EP    Pain Management Panel          View : No data to display.                      EKG:      None    Radiology:    No radiology results for the last 3 days    Assessment/Plan:    Inpatient general floor admission 4/29/2023 with cellulitis and diabetic foot ulcer of left foot, failed outpatient treatment.    Cellulitis, POA  Diabetic foot ulcer  Vancomycin and Zosyn started 4/29.  Blood cultures collected.  Wound culture collected.  No obvious sign of osteomyelitis on initial x-ray, radiology interpretation pending.  MRI left foot ordered.  Podiatry consultation, recommendations appreciated, will be able to see him as early as Monday 5/1.    Chronic:  type 2 diabetes, dyslipidemia, hypothyroidism, GERD, peripheral neuropathy.     Hold home diabetic medications.  Provide subcutaneous insulin protocol.  Continue other home medications.    Risk Assessment: High  DVT Prophylaxis: SCDs  Code Status: Full code  Diet: Heart healthy/carbohydrate controlled    Advance Care Planning   ACP discussion was held with the patient during this visit. Patient does not have an advance directive, declines further assistance.          Brian Joseph Kerley, DO  04/29/23  00:59 EDT    Dictated utilizing Dragon dictation.    Electronically signed by Kerley, Brian Joseph, DO at 04/29/23 0139          Emergency Department Notes      Abhijit Monae, VIRGILIO at 04/28/23 2022     Attestation signed by Jonathan Grove MD at 04/29/23 0359        NON FACE TO FACE: This visit was performed by BOTH a physician and an APC. I performed all aspects of the MDM as documented.  Jonathan Grove MD 4/29/2023 03:59 EDT                         Subjective  History of Present Illness:    Patient is a 62-year-old male here today with high blood sugar and left foot pain.  He is companied by his wife helps provide history.  Patient states that over the past couple days he has had an elevated blood sugar, as high as 400 at home.  He is noted that his left  "foot has become progressively more reddened and swollen for the past 2 to 3 days.  He is followed by a podiatrist for a diabetic foot ulcer to the left foot and recently started taking antibiotics this week.  He states after approximately 2 days of the antibiotics he feels that the foot and second toe appear worse.  He has a history of diabetes and neuropathy.  Also has a history of hypothyroidism.  Patient has noted a fever at home, but denies any chest pain or shortness of breath.  He has not noted any swelling to his left lower leg or ankle, but notes that the his skin feels hotter.    Nurses Notes reviewed and agree, including vitals, allergies, social history and prior medical history.     REVIEW OF SYSTEMS: All systems reviewed and not pertinent unless noted.  Review of Systems    Past Medical History:   Diagnosis Date   • Diabetes mellitus        Allergies:    Patient has no known allergies.      Past Surgical History:   Procedure Laterality Date   • COLONOSCOPY     • ENDOSCOPY           Social History     Socioeconomic History   • Marital status:    Tobacco Use   • Smoking status: Never   • Smokeless tobacco: Never   Vaping Use   • Vaping Use: Never used   Substance and Sexual Activity   • Alcohol use: Never   • Drug use: Never   • Sexual activity: Defer         No family history on file.    Objective  Physical Exam:  /95   Pulse 105   Temp 98.5 °F (36.9 °C) (Oral)   Resp 16   Ht 185.4 cm (73\")   Wt 90.7 kg (200 lb)   SpO2 91%   BMI 26.39 kg/m²      Physical Exam  Vitals and nursing note reviewed.   Constitutional:       Appearance: Normal appearance. He is normal weight.   HENT:      Head: Normocephalic and atraumatic.   Cardiovascular:      Rate and Rhythm: Normal rate and regular rhythm.      Pulses: Normal pulses.      Heart sounds: Normal heart sounds.   Pulmonary:      Effort: Pulmonary effort is normal.      Breath sounds: Normal breath sounds.   Abdominal:      General: Abdomen is " flat. Bowel sounds are normal. There is no distension.      Palpations: Abdomen is soft.      Tenderness: There is no abdominal tenderness.   Musculoskeletal:      Right lower leg: No edema.      Left lower leg: No edema.        Feet:    Feet:      Left foot:      Skin integrity: Ulcer and erythema present.   Skin:     General: Skin is warm and dry.      Capillary Refill: Capillary refill takes less than 2 seconds.   Neurological:      General: No focal deficit present.      Mental Status: He is alert and oriented to person, place, and time.   Psychiatric:         Mood and Affect: Mood normal.         Behavior: Behavior normal.         Procedures    ED Course:         Lab Results (last 24 hours)     Procedure Component Value Units Date/Time    Lactic Acid, Plasma [[id number]]  (Normal) Collected: 04/28/23 1940    Specimen: Blood Updated: 04/28/23 2043     Lactate 1.7 mmol/L     POC Glucose Once [[id number]]  (Abnormal) Collected: 04/28/23 1944    Specimen: Blood Updated: 04/28/23 1952     Glucose 285 mg/dL      Comment: Serial Number: [id number]:  [email or web address]       CBC & Differential [[id number]]  (Abnormal) Collected: 04/28/23 1948    Specimen: Blood Updated: 04/28/23 2027    Narrative:      The following orders were created for panel order CBC & Differential.  Procedure                               Abnormality         Status                     ---------                               -----------         ------                     CBC Auto Differential[[id number]]        Abnormal            Final result                 Please view results for these tests on the individual orders.    Comprehensive Metabolic Panel [[id number]]  (Abnormal) Collected: 04/28/23 1948    Specimen: Blood Updated: 04/28/23 2102     Glucose 320 mg/dL      Comment: Glucose >180, Hemoglobin A1C recommended.        BUN 17 mg/dL      Creatinine 0.84 mg/dL      Sodium 132 mmol/L      Potassium 3.6 mmol/L      Chloride 96 mmol/L      CO2 23.0  "mmol/L      Calcium 8.9 mg/dL      Total Protein 8.0 g/dL      Albumin 3.6 g/dL      ALT (SGPT) 17 U/L      AST (SGOT) 16 U/L      Alkaline Phosphatase 130 U/L      Total Bilirubin 0.9 mg/dL      Globulin 4.4 gm/dL      A/G Ratio 0.8 g/dL      BUN/Creatinine Ratio 20.2     Anion Gap 13.0 mmol/L      eGFR 99.8 mL/min/1.73     Narrative:      GFR Normal >60  Chronic Kidney Disease <60  Kidney Failure <15      Sedimentation Rate [[id number]]  (Abnormal) Collected: 04/28/23 1948    Specimen: Blood Updated: 04/28/23 2030     Sed Rate 64 mm/hr     C-reactive Protein [[id number]]  (Abnormal) Collected: 04/28/23 1948    Specimen: Blood Updated: 04/28/23 2102     C-Reactive Protein 13.21 mg/dL     Procalcitonin [[id number]]  (Normal) Collected: 04/28/23 1948    Specimen: Blood Updated: 04/28/23 2049     Procalcitonin 0.23 ng/mL     Narrative:      As a Marker for Sepsis (Non-Neonates):    1. <0.5 ng/mL represents a low risk of severe sepsis and/or septic shock.  2. >2 ng/mL represents a high risk of severe sepsis and/or septic shock.    As a Marker for Lower Respiratory Tract Infections that require antibiotic therapy:    PCT on Admission    Antibiotic Therapy       6-12 Hrs later    >0.5                Strongly Recommended  >0.25 - <0.5        Recommended   0.1 - 0.25          Discouraged              Remeasure/reassess PCT  <0.1                Strongly Discouraged     Remeasure/reassess PCT    As 28 day mortality risk marker: \"Change in Procalcitonin Result\" (>80% or <=80%) if Day 0 (or Day 1) and Day 4 values are available. Refer to http://www.[facility]-pct-calculator.com    Change in PCT <=80%  A decrease of PCT levels below or equal to 80% defines a positive change in PCT test result representing a higher risk for 28-day all-cause mortality of patients diagnosed with severe sepsis for septic shock.    Change in PCT >80%  A decrease of PCT levels of more than 80% defines a negative change in PCT result representing a lower " risk for 28-day all-cause mortality of patients diagnosed with severe sepsis or septic shock.       CBC Auto Differential [[id number]]  (Abnormal) Collected: 04/28/23 1948    Specimen: Blood Updated: 04/28/23 2027     WBC 17.05 10*3/mm3      RBC 5.15 10*6/mm3      Hemoglobin 14.5 g/dL      Hematocrit 41.6 %      MCV 80.8 fL      MCH 28.2 pg      MCHC 34.9 g/dL      RDW 12.1 %      RDW-SD 35.3 fl      MPV 10.4 fL      Platelets 257 10*3/mm3      Neutrophil % 75.3 %      Lymphocyte % 13.3 %      Monocyte % 10.4 %      Eosinophil % 0.2 %      Basophil % 0.3 %      Immature Grans % 0.5 %      Neutrophils, Absolute 12.83 10*3/mm3      Lymphocytes, Absolute 2.27 10*3/mm3      Monocytes, Absolute 1.78 10*3/mm3      Eosinophils, Absolute 0.04 10*3/mm3      Basophils, Absolute 0.05 10*3/mm3      Immature Grans, Absolute 0.08 10*3/mm3      nRBC 0.0 /100 WBC     Wound Culture - Wound, Foot, Left [[id number]] Collected: 04/28/23 2039    Specimen: Wound from Foot, Left Updated: 04/28/23 2057     Gram Stain Rare (1+) Gram positive cocci in pairs    Blood Culture With JESSICA - Blood, Arm, Left [[id number]] Collected: 04/28/23 2042    Specimen: Blood from Arm, Left Updated: 04/28/23 2056    Blood Culture With JESSICA - Blood, Hand, Left [[id number]] Collected: 04/28/23 2048    Specimen: Blood from Hand, Left Updated: 04/28/23 2056           No radiology results from the last 24 hrs       MDM    Initial impression of presenting illness: Hyperglycemia, left foot pain and erythema    DDX: includes but is not limited to: Sepsis, osteomyelitis, cellulitis, infected diabetic ulcer    Patient arrives hemodynamically stable, afebrile and normotensive, with vitals interpreted by myself.     Pertinent features from physical exam: Erythematous left foot from the second and third digit to the midfoot.  Noted swelling to the second digit with skin discoloration and surrounding tissue underneath the toe is macerated.  Drainage noted within the webbings.   Increased skin temperature noted up to left calf.    Initial diagnostic plan: CBC, CMP, procalcitonin, lactic acid, sed rate, CRP, wound culture, blood cultures, and x-ray of the left foot    Results from initial plan were reviewed and interpreted by me revealing white blood count of 17.05.  Inflammatory markers show a CRP of 13.21 and a sed rate of 64.  His procalcitonin and lactate are normal.  Glucose is elevated to 320, but he has normal liver and kidney function.  Blood cultures are pending.  Wound culture shows preliminary results of rare gram-positive cocci in pairs.  No acute osteomyelitis noted on left foot, reviewed with Dr. Grove.    Diagnostic information from other sources: Medical record    Interventions / Re-evaluation: Patient was given cefepime and vancomycin to treat his cellulitis.  During his visit he did not have any episodes of a fever or hypotension.  Patient appears comfortable in the stretcher waiting for his disposition.    Results/clinical rationale were discussed with Dr. Grove.    Consultations/Discussion of results with other physicians: Initially spoke with Dr. Kerley to have the patient admitted here for failed outpatient treatment.  Unfortunately, do not have podiatry on-call in the evening or on weekends.    [facility] in [place] notified first, no beds available per  is in the waiting list could extend until Monday.  Patient was placed on the waiting list at this time.    [facility] notified [facility] and I spoke with Dr. Horan who states that they also have a long wait list for admissions and recommended having the patient follow-up with the podiatrist here when they are available.    [facility] was notified last, however, unable to speak with a hospitalist at this time.    Spoke again with Dr. Kerley about the calls above. Will come evaluate the patient and admit here waiting for podiatry consult when able.    Disposition plan:  Patient admitted to the hospital for further care and management.  -----    Final diagnoses:   Cellulitis in diabetic foot        Abhijit Monae APRN  04/29/23 0139      Electronically signed by Jonathan Grove MD at 04/29/23 0359     Domingo Siegel at 04/29/23 0028     Summary:[facility] Consult             [facility] called per VIRGILIO Lacey at this time to request Transfer Doctor or Podiatry.     Electronically signed by Domingo Siegel at 04/29/23 0029     Domingo Siegel at 04/29/23 0128     Summary:Bed Assignment             Per House Supervisor the patient will be going to 311    Electronically signed by Domingo Siegel at 04/29/23 0128         Current Facility-Administered Medications   Medication Dose Route Frequency Provider Last Rate Last Admin   • acetaminophen (TYLENOL) tablet 650 mg  650 mg Oral Q4H PRN Kerley, Brian Joseph, DO        Or   • acetaminophen (TYLENOL) 160 MG/5ML solution 650 mg  650 mg Oral Q4H PRN Kerley, Brian Joseph, DO        Or   • acetaminophen (TYLENOL) suppository 650 mg  650 mg Rectal Q4H PRN Kerley, Brian Joseph, DO       • atorvastatin (LIPITOR) tablet 40 mg  40 mg Oral Daily Kerley, Brian Joseph, DO   40 mg at 04/29/23 0802   • Calcium Replacement - Follow Nurse / BPA Driven Protocol   Does not apply PRN Kerley, Brian Joseph, DO       • dextrose (D50W) (25 g/50 mL) IV injection 25 g  25 g Intravenous Q15 Min PRN Kerley, Brian Joseph, DO       • dextrose (GLUTOSE) oral gel 15 g  15 g Oral Q15 Min PRN Kerley, Brian Joseph, DO       • glucagon (GLUCAGEN) injection 1 mg  1 mg Intramuscular Q15 Min PRN Kerley, Brian Joseph, DO       • Insulin Aspart (novoLOG) injection 0-9 Units  0-9 Units Subcutaneous TID AC Kerley, Brian Joseph, DO   6 Units at 04/29/23 0641   • insulin detemir (LEVEMIR) injection 25 Units  25 Units Subcutaneous Nightly Wenceslao Babb DO       • levothyroxine (SYNTHROID, LEVOTHROID) tablet 75 mcg  75 mcg Oral Q AM Kerley, Brian Joseph, DO   75 mcg at 04/29/23 0549    • Magnesium Standard Dose Replacement - Follow Nurse / BPA Driven Protocol   Does not apply PRN Kerley, Brian Joseph, DO       • ondansetron (ZOFRAN) injection 4 mg  4 mg Intravenous Q6H PRN Kerley, Brian Joseph, DO       • Pharmacy to dose vancomycin   Does not apply Continuous PRN Kerley, Brian Joseph, DO       • Pharmacy to Dose Zosyn   Does not apply Continuous PRN Kerley, Brian Joseph, DO       • Phosphorus Replacement - Follow Nurse / BPA Driven Protocol   Does not apply PRN Kerley, Brian Joseph, DO       • piperacillin-tazobactam (ZOSYN) 3.375 g in iso-osmotic dextrose 50 ml (premix)  3.375 g Intravenous Q8H Kerley, Brian Joseph, DO       • Potassium Replacement - Follow Nurse / BPA Driven Protocol   Does not apply PRN Kerley, Brian Joseph, DO       • sodium chloride 0.9 % flush 10 mL  10 mL Intravenous PRN Kerley, Brian Joseph, DO       • sodium chloride 0.9 % flush 10 mL  10 mL Intravenous Q12H Kerley, Brian Joseph, DO   10 mL at 04/29/23 0802   • sodium chloride 0.9 % flush 10 mL  10 mL Intravenous PRN Kerley, Brian Joseph, DO       • sodium chloride 0.9 % infusion 40 mL  40 mL Intravenous PRN Kerley, Brian Joseph, DO       • vancomycin 1250 mg/250 mL 0.9% NS IVPB ([facility])  1,250 mg Intravenous Q12H Kerley, Brian Joseph,          Physician Progress Notes (last 24 hours)  Notes from 04/28/23 0858 through 04/29/23 0858   No notes of this type exist for this encounter.

## 2023-04-29 NOTE — NURSING NOTE
Home med rec unable to be confirmed at this time. Pt unsure of home medication; wife to bring medication bottles tomorrow morning. Dr. Kerley notified.

## 2023-04-29 NOTE — PROGRESS NOTES
Pharmacokinetic Consult - Piperacillin-tazobactam Dosing    Seb Pettit is a 62 y.o. male who has been consulted to dose piperacillin-tazobactam for bone and/or joint infection.    Current Antimicrobial Therapy    Anti-Infectives (From admission, onward)      Ordered     Dose/Rate Route Frequency Start Stop    04/29/23 0206  piperacillin-tazobactam (ZOSYN) 3.375 g in iso-osmotic dextrose 50 ml (premix)        Ordering Provider: Kerley, Brian Joseph, DO    3.375 g  over 4 Hours Intravenous Every 8 Hours 04/29/23 0900 05/06/23 0859    04/29/23 0226  vancomycin 1 g/250 mL 0.9% NS (vial-mate)        Ordering Provider: Kerley, Brian Joseph, DO    1,000 mg  over 60 Minutes Intravenous Every 12 Hours 04/29/23 0800 05/06/23 0759    04/29/23 0206  piperacillin-tazobactam (ZOSYN) 3.375 g in iso-osmotic dextrose 50 ml (premix)        Ordering Provider: Kerley, Brian Joseph, DO    3.375 g  over 30 Minutes Intravenous Once 04/29/23 0300      04/29/23 0149  Pharmacy to dose vancomycin        Ordering Provider: Kerley, Brian Joseph, DO     Does not apply Continuous PRN 04/29/23 0149 05/06/23 0148    04/29/23 0149  Pharmacy to Dose Zosyn        Ordering Provider: Kerley, Brian Joseph, DO     Does not apply Continuous PRN 04/29/23 0149 05/06/23 0148    04/28/23 2051  cefepime (MAXIPIME) IVPB 2 g/50ml D5W (premix)        Ordering Provider: Abhijit Monae APRN    2 g Intravenous Once 04/28/23 2053 04/28/23 2150 04/28/23 2051  vancomycin 1750 mg/500 mL 0.9% NS IVPB ([facility])        Ordering Provider: Abhijit Monae APRN    20 mg/kg × 90.7 kg Intravenous Once 04/28/23 2053 04/29/23 0030            Microbiology Results (last 10 days)       Procedure Component Value - Date/Time    Wound Culture - Wound, Foot, Left [[id number]] Collected: 04/28/23 2039    Lab Status: Preliminary result Specimen: Wound from Foot, Left Updated: 04/28/23 2057     Gram Stain Rare (1+) Gram positive cocci in pairs             Allergies    Patient has no  known allergies.    Relevant clinical data and objective history reviewed:    Creatinine   Date Value Ref Range Status   04/28/2023 0.84 0.76 - 1.27 mg/dL Final   05/07/2021 0.80 0.60 - 1.30 mg/dL Final     Comment:     Serial Number: [id number]:  [id number]     Estimated Creatinine Clearance: 120.5 mL/min (by C-G formula based on SCr of 0.84 mg/dL).    No intake/output data recorded.    Patient weight: 91.1 kg (200 lb 13.4 oz)    Asessment/Plan    Initiate Piperacillin-tazobactam 3.375 g IV every 8 hours  Pharmacy will monitor Mr. Pettit's renal function and clinical status and adjust the piperacillin-tazobactam dose and/or frequency as needed.    Thank you,    Selina Riggins RP,PharmD  4/29/2023  02:30 EDT

## 2023-04-29 NOTE — PLAN OF CARE
Goal Outcome Evaluation:  Plan of Care Reviewed With: patient        Progress: no change  Outcome Evaluation: PT LYING IN BED RESTING COMFORTABLY AT THIS TIME. VSS. WILL CONTINUE TO MONITOR.

## 2023-04-30 LAB
ANION GAP SERPL CALCULATED.3IONS-SCNC: 9.6 MMOL/L (ref 5–15)
BASOPHILS # BLD AUTO: 0.03 10*3/MM3 (ref 0–0.2)
BASOPHILS NFR BLD AUTO: 0.3 % (ref 0–1.5)
BUN SERPL-MCNC: 12 MG/DL (ref 8–23)
BUN/CREAT SERPL: 14.3 (ref 7–25)
CALCIUM SPEC-SCNC: 8.9 MG/DL (ref 8.6–10.5)
CHLORIDE SERPL-SCNC: 102 MMOL/L (ref 98–107)
CO2 SERPL-SCNC: 27.4 MMOL/L (ref 22–29)
CREAT SERPL-MCNC: 0.84 MG/DL (ref 0.76–1.27)
DEPRECATED RDW RBC AUTO: 36.5 FL (ref 37–54)
EGFRCR SERPLBLD CKD-EPI 2021: 99.8 ML/MIN/1.73
EOSINOPHIL # BLD AUTO: 0.2 10*3/MM3 (ref 0–0.4)
EOSINOPHIL NFR BLD AUTO: 1.9 % (ref 0.3–6.2)
ERYTHROCYTE [DISTWIDTH] IN BLOOD BY AUTOMATED COUNT: 12.2 % (ref 12.3–15.4)
GLUCOSE BLDC GLUCOMTR-MCNC: 210 MG/DL (ref 70–130)
GLUCOSE BLDC GLUCOMTR-MCNC: 220 MG/DL (ref 70–130)
GLUCOSE BLDC GLUCOMTR-MCNC: 235 MG/DL (ref 70–130)
GLUCOSE BLDC GLUCOMTR-MCNC: 248 MG/DL (ref 70–130)
GLUCOSE SERPL-MCNC: 251 MG/DL (ref 65–99)
HCT VFR BLD AUTO: 41 % (ref 37.5–51)
HGB BLD-MCNC: 14 G/DL (ref 13–17.7)
IMM GRANULOCYTES # BLD AUTO: 0.06 10*3/MM3 (ref 0–0.05)
IMM GRANULOCYTES NFR BLD AUTO: 0.6 % (ref 0–0.5)
LYMPHOCYTES # BLD AUTO: 2.05 10*3/MM3 (ref 0.7–3.1)
LYMPHOCYTES NFR BLD AUTO: 19 % (ref 19.6–45.3)
MCH RBC QN AUTO: 28.1 PG (ref 26.6–33)
MCHC RBC AUTO-ENTMCNC: 34.1 G/DL (ref 31.5–35.7)
MCV RBC AUTO: 82.2 FL (ref 79–97)
MONOCYTES # BLD AUTO: 1.07 10*3/MM3 (ref 0.1–0.9)
MONOCYTES NFR BLD AUTO: 9.9 % (ref 5–12)
NEUTROPHILS NFR BLD AUTO: 68.3 % (ref 42.7–76)
NEUTROPHILS NFR BLD AUTO: 7.4 10*3/MM3 (ref 1.7–7)
NRBC BLD AUTO-RTO: 0 /100 WBC (ref 0–0.2)
PLATELET # BLD AUTO: 257 10*3/MM3 (ref 140–450)
PMV BLD AUTO: 9.5 FL (ref 6–12)
POTASSIUM SERPL-SCNC: 3.5 MMOL/L (ref 3.5–5.2)
RBC # BLD AUTO: 4.99 10*6/MM3 (ref 4.14–5.8)
SODIUM SERPL-SCNC: 139 MMOL/L (ref 136–145)
WBC NRBC COR # BLD: 10.81 10*3/MM3 (ref 3.4–10.8)

## 2023-04-30 PROCEDURE — 99232 SBSQ HOSP IP/OBS MODERATE 35: CPT | Performed by: INTERNAL MEDICINE

## 2023-04-30 PROCEDURE — 85025 COMPLETE CBC W/AUTO DIFF WBC: CPT | Performed by: STUDENT IN AN ORGANIZED HEALTH CARE EDUCATION/TRAINING PROGRAM

## 2023-04-30 PROCEDURE — 25010000002 VANCOMYCIN 5 G RECONSTITUTED SOLUTION: Performed by: STUDENT IN AN ORGANIZED HEALTH CARE EDUCATION/TRAINING PROGRAM

## 2023-04-30 PROCEDURE — 63710000001 INSULIN DETEMIR PER 5 UNITS: Performed by: INTERNAL MEDICINE

## 2023-04-30 PROCEDURE — 25010000002 VANCOMYCIN 5 G RECONSTITUTED SOLUTION: Performed by: INTERNAL MEDICINE

## 2023-04-30 PROCEDURE — 82948 REAGENT STRIP/BLOOD GLUCOSE: CPT

## 2023-04-30 PROCEDURE — 80048 BASIC METABOLIC PNL TOTAL CA: CPT | Performed by: STUDENT IN AN ORGANIZED HEALTH CARE EDUCATION/TRAINING PROGRAM

## 2023-04-30 PROCEDURE — 25010000002 PIPERACILLIN SOD-TAZOBACTAM PER 1 G: Performed by: STUDENT IN AN ORGANIZED HEALTH CARE EDUCATION/TRAINING PROGRAM

## 2023-04-30 PROCEDURE — 96372 THER/PROPH/DIAG INJ SC/IM: CPT

## 2023-04-30 PROCEDURE — 25010000002 ENOXAPARIN PER 10 MG: Performed by: INTERNAL MEDICINE

## 2023-04-30 PROCEDURE — 63710000001 INSULIN ASPART PER 5 UNITS: Performed by: STUDENT IN AN ORGANIZED HEALTH CARE EDUCATION/TRAINING PROGRAM

## 2023-04-30 RX ORDER — ENOXAPARIN SODIUM 100 MG/ML
40 INJECTION SUBCUTANEOUS NIGHTLY
Status: DISCONTINUED | OUTPATIENT
Start: 2023-04-30 | End: 2023-05-03 | Stop reason: HOSPADM

## 2023-04-30 RX ORDER — CALCIUM CARBONATE 500 MG/1
2 TABLET, CHEWABLE ORAL 3 TIMES DAILY PRN
Status: DISCONTINUED | OUTPATIENT
Start: 2023-04-30 | End: 2023-05-03 | Stop reason: HOSPADM

## 2023-04-30 RX ADMIN — INSULIN ASPART 4 UNITS: 100 INJECTION, SOLUTION INTRAVENOUS; SUBCUTANEOUS at 06:53

## 2023-04-30 RX ADMIN — Medication 10 ML: at 08:42

## 2023-04-30 RX ADMIN — TAZOBACTAM SODIUM AND PIPERACILLIN SODIUM 3.38 G: 375; 3 INJECTION, SOLUTION INTRAVENOUS at 17:44

## 2023-04-30 RX ADMIN — TAZOBACTAM SODIUM AND PIPERACILLIN SODIUM 3.38 G: 375; 3 INJECTION, SOLUTION INTRAVENOUS at 08:41

## 2023-04-30 RX ADMIN — INSULIN DETEMIR 30 UNITS: 100 INJECTION, SOLUTION SUBCUTANEOUS at 20:50

## 2023-04-30 RX ADMIN — ENOXAPARIN SODIUM 40 MG: 100 INJECTION SUBCUTANEOUS at 20:50

## 2023-04-30 RX ADMIN — INSULIN ASPART 4 UNITS: 100 INJECTION, SOLUTION INTRAVENOUS; SUBCUTANEOUS at 13:43

## 2023-04-30 RX ADMIN — INSULIN ASPART 4 UNITS: 100 INJECTION, SOLUTION INTRAVENOUS; SUBCUTANEOUS at 17:44

## 2023-04-30 RX ADMIN — VANCOMYCIN HYDROCHLORIDE 1250 MG: 5 INJECTION, POWDER, LYOPHILIZED, FOR SOLUTION INTRAVENOUS at 06:01

## 2023-04-30 RX ADMIN — LEVOTHYROXINE SODIUM 75 MCG: 0.07 TABLET ORAL at 06:01

## 2023-04-30 RX ADMIN — VANCOMYCIN HYDROCHLORIDE 1250 MG: 5 INJECTION, POWDER, LYOPHILIZED, FOR SOLUTION INTRAVENOUS at 20:49

## 2023-04-30 RX ADMIN — Medication 10 ML: at 20:51

## 2023-04-30 RX ADMIN — TAZOBACTAM SODIUM AND PIPERACILLIN SODIUM 3.38 G: 375; 3 INJECTION, SOLUTION INTRAVENOUS at 00:35

## 2023-04-30 RX ADMIN — ATORVASTATIN CALCIUM 40 MG: 40 TABLET, FILM COATED ORAL at 08:41

## 2023-04-30 RX ADMIN — CALCIUM CARBONATE (ANTACID) CHEW TAB 500 MG 2 TABLET: 500 CHEW TAB at 23:49

## 2023-04-30 NOTE — PROGRESS NOTES
[facility] HOSPITALIST    PROGRESS NOTE    Name:  Seb Pettit   Age:  62 y.o.  Sex:  male  :  1960  MRN:  [id number]   Visit Number:  [id number]  Admission Date:  2023  Date Of Service:  23  Primary Care Physician:  Amado Phillips PA     LOS: 1 day :    Chief Complaint:      Left foot pain and swelling    Subjective:    Patient seen and evaluated this morning.  Sitting on the side of bed eating breakfast.  Denies complaints at this time.  Already asking to go home.  Long conversation had regarding the importance of adherence to diabetic regimen.  No acute events overnight per nursing staff.    Hospital Course:    Patient is a 62-year-old man with past medical history of type 2 diabetes, dyslipidemia, hypothyroidism, GERD, peripheral neuropathy.  Presented to [facility] ED on 2023 with concern for high blood sugar and left foot pain.  Said that about 6 weeks prior he stepped on a piece of glass and has had a foot ulcer since then, has been following with podiatry in [place].    ED summary: Afebrile, vital signs stable on room air.  Blood glucose 320, sodium 132.  CRP 13.  Procalcitonin negative.  Leukocytosis 17.  Sed rate 64.  Blood cultures collected.  Wound culture collected.  XR left foot per ED interpretation soft tissue swelling noted to second toe, no evidence of osteomyelitis.      Review of Systems:     All systems were reviewed and negative except as mentioned in subjective, assessment and plan.    Vital Signs:    Temp:  [97.9 °F (36.6 °C)-99.3 °F (37.4 °C)] 97.9 °F (36.6 °C)  Heart Rate:  [62-85] 62  Resp:  [16-20] 18  BP: (128-143)/(77-89) 140/88    Intake and output:    I/O last 3 completed shifts:  In: 1030 [P.O.:480; IV Piggyback:550]  Out: 2325 [Urine:2325]  No intake/output data recorded.    Physical Examination:    General Appearance:  Alert and cooperative.    Head:  Atraumatic and normocephalic.   Eyes: Conjunctivae and sclerae normal, no icterus. No  pallor.   Throat: No oral lesions, no thrush, oral mucosa moist.   Neck: Supple, trachea midline, no thyromegaly.   Lungs:   Breath sounds heard bilaterally equally.  No wheezing or crackles. No Pleural rub or bronchial breathing.   Heart:  Normal S1 and S2, no murmur, no gallop, no rub. No JVD.   Abdomen:   Normal bowel sounds, no masses, no organomegaly. Soft, nontender, nondistended, no rebound tenderness.   Extremities: Supple, no edema, circumferential ulcer to left foot with associated cellulitis and edema especially of the second metatarsal   Skin: No bleeding or rash.   Neurologic: Alert and oriented x 3. No facial asymmetry. Moves all four limbs. No tremors.      Laboratory results:    Results from last 7 days   Lab Units 04/30/23  0553 04/29/23 0617 04/28/23 1948   SODIUM mmol/L 139 130* 132*   POTASSIUM mmol/L 3.5 3.9 3.6   CHLORIDE mmol/L 102 98 96*   CO2 mmol/L 27.4 23.9 23.0   BUN mg/dL 12 12 17   CREATININE mg/dL 0.84 0.74* 0.84   CALCIUM mg/dL 8.9 8.4* 8.9   BILIRUBIN mg/dL  --   --  0.9   ALK PHOS U/L  --   --  130*   ALT (SGPT) U/L  --   --  17   AST (SGOT) U/L  --   --  16   GLUCOSE mg/dL 251* 285* 320*     Results from last 7 days   Lab Units 04/30/23  0553 04/29/23  0617 04/28/23 1948   WBC 10*3/mm3 10.81* 14.31* 17.05*   HEMOGLOBIN g/dL 14.0 13.6 14.5   HEMATOCRIT % 41.0 40.2 41.6   PLATELETS 10*3/mm3 257 239 257             Results from last 7 days   Lab Units 04/28/23 2048 04/28/23 2042   BLOODCX  No growth at 24 hours No growth at 24 hours     No results for input(s): PHART, ESH2NZU, PO2ART, PAO7TPV, BASEEXCESS in the last 8760 hours.   I have reviewed the patient's laboratory results.    Radiology results:    XR Foot 3+ View Left    Result Date: 4/29/2023  LEFT FOOT  HISTORY: Left foot pain and redness.  FINDINGS:  A three view exam demonstrates no acute fracture or dislocation. The joint spaces appear normal. Prominent plantar calcaneal spur. Arterial calcifications. No bone  destruction.      Impression: No acute fracture.     This report was signed and finalized on 4/29/2023 6:57 AM by Dr Ja Powell DO.    CT Lower Extremity Left Without Contrast    Result Date: 4/29/2023  CT SCAN OF THE RIGHT FOOT WITHOUT CONTRAST 4/29/2023 1:19 PM  HISTORY: Foot swelling, osteomyelitis suspected  COMPARISON: None.  PROCEDURE: Axial images obtained through the right foot. Coronal and sagittal reformatted images were obtained. This study was performed with techniques to keep radiation doses as low as reasonably achievable, (ALARA). Individualized dose reduction techniques using automated exposure control or adjustment of mA and/or kV according to the patient size were employed.  FINDINGS: Small soft tissue ulcer noted underlying the second metatarsophalangeal joint. There is no osteolysis/bone destruction or periosteal reaction to suggest osteomyelitis. No fracture or dislocation seen. Calcaneal spurs/enthesophytes noted with chronic thickening of the plantar fascia. Arterial calcifications are noted. Diffuse soft tissue swelling. No definite fluid collection.      Impression: Plantar soft tissue ulcer underlying the second MTP joint. No CT evidence of osteomyelitis. Would recommend MRI however.  This report was signed and finalized on 4/29/2023 1:44 PM by Dr Ja Powell DO.    I have reviewed the patient's radiology reports.    Medication Review:     I have reviewed the patient's active and prn medications.     Problem List:      Cellulitis in diabetic foot    T2DM (type 2 diabetes mellitus)    Hypothyroid    Diabetes mellitus with neuropathy    Dyslipidemia      Assessment:    1. Nonhealing diabetic left foot ulcer with associated cellulitis, POA  2. Poorly managed insulin-dependent type 2 diabetes  3. Hypertension  4. Hyperlipidemia  5. Hypothyroidism  6. Diabetic peripheral neuropathy  7. GERD    Plan:    Continue to monitor patient in the hospital.    Nonhealing diabetic left foot ulcer with  associated cellulitis  - Continue broad-spectrum empiric antibiotics with vancomycin and Zosyn.  Follow cultures, negative to date.  Wound consult.  No radiographic evidence of osteomyelitis on CT of the left lower extremity.  Podiatry consultation once available.    Poorly managed type 2 diabetes  - Levemir, Accu-Cheks, sliding scale insulin.  Diabetic educator.    Further orders as clinical course dictates.    DVT Prophylaxis: Lovenox  Code Status: Full  Diet: Cardiac/diabetic  Discharge Plan: Pending    Wenceslao Babb DO  04/30/23  09:14 EDT    Dictated utilizing Dragon dictation.

## 2023-04-30 NOTE — PLAN OF CARE
Goal Outcome Evaluation:  Plan of Care Reviewed With: patient        Progress: no change  Outcome Evaluation: PT LYING IN BED RESTING COMFORTABLY ON RA AT THIS TIME. NO ACUTE CHANGES OVERNIGHT. VSS. WILL CONTINUE TO MONITOR.

## 2023-04-30 NOTE — PLAN OF CARE
Goal Outcome Evaluation:  Plan of Care Reviewed With: patient        Progress: improving  Outcome Evaluation: VSS. Dressing changed on pt's left foot.

## 2023-05-01 LAB
ANION GAP SERPL CALCULATED.3IONS-SCNC: 7.7 MMOL/L (ref 5–15)
BACTERIA SPEC AEROBE CULT: ABNORMAL
BACTERIA SPEC AEROBE CULT: ABNORMAL
BASOPHILS # BLD AUTO: 0.04 10*3/MM3 (ref 0–0.2)
BASOPHILS NFR BLD AUTO: 0.4 % (ref 0–1.5)
BUN SERPL-MCNC: 12 MG/DL (ref 8–23)
BUN/CREAT SERPL: 15 (ref 7–25)
CALCIUM SPEC-SCNC: 9.1 MG/DL (ref 8.6–10.5)
CHLORIDE SERPL-SCNC: 102 MMOL/L (ref 98–107)
CO2 SERPL-SCNC: 28.3 MMOL/L (ref 22–29)
CREAT SERPL-MCNC: 0.8 MG/DL (ref 0.76–1.27)
DEPRECATED RDW RBC AUTO: 36.9 FL (ref 37–54)
EGFRCR SERPLBLD CKD-EPI 2021: 101.3 ML/MIN/1.73
EOSINOPHIL # BLD AUTO: 0.23 10*3/MM3 (ref 0–0.4)
EOSINOPHIL NFR BLD AUTO: 2.3 % (ref 0.3–6.2)
ERYTHROCYTE [DISTWIDTH] IN BLOOD BY AUTOMATED COUNT: 12.3 % (ref 12.3–15.4)
GLUCOSE BLDC GLUCOMTR-MCNC: 158 MG/DL (ref 70–130)
GLUCOSE BLDC GLUCOMTR-MCNC: 181 MG/DL (ref 70–130)
GLUCOSE BLDC GLUCOMTR-MCNC: 199 MG/DL (ref 70–130)
GLUCOSE BLDC GLUCOMTR-MCNC: 246 MG/DL (ref 70–130)
GLUCOSE SERPL-MCNC: 264 MG/DL (ref 65–99)
GRAM STN SPEC: ABNORMAL
HCT VFR BLD AUTO: 39.7 % (ref 37.5–51)
HGB BLD-MCNC: 13.5 G/DL (ref 13–17.7)
IMM GRANULOCYTES # BLD AUTO: 0.06 10*3/MM3 (ref 0–0.05)
IMM GRANULOCYTES NFR BLD AUTO: 0.6 % (ref 0–0.5)
LYMPHOCYTES # BLD AUTO: 2.15 10*3/MM3 (ref 0.7–3.1)
LYMPHOCYTES NFR BLD AUTO: 21.9 % (ref 19.6–45.3)
MCH RBC QN AUTO: 28 PG (ref 26.6–33)
MCHC RBC AUTO-ENTMCNC: 34 G/DL (ref 31.5–35.7)
MCV RBC AUTO: 82.2 FL (ref 79–97)
MONOCYTES # BLD AUTO: 0.89 10*3/MM3 (ref 0.1–0.9)
MONOCYTES NFR BLD AUTO: 9.1 % (ref 5–12)
NEUTROPHILS NFR BLD AUTO: 6.45 10*3/MM3 (ref 1.7–7)
NEUTROPHILS NFR BLD AUTO: 65.7 % (ref 42.7–76)
NRBC BLD AUTO-RTO: 0 /100 WBC (ref 0–0.2)
PLATELET # BLD AUTO: 298 10*3/MM3 (ref 140–450)
PMV BLD AUTO: 9.7 FL (ref 6–12)
POTASSIUM SERPL-SCNC: 3.7 MMOL/L (ref 3.5–5.2)
RBC # BLD AUTO: 4.83 10*6/MM3 (ref 4.14–5.8)
SODIUM SERPL-SCNC: 138 MMOL/L (ref 136–145)
VANCOMYCIN TROUGH SERPL-MCNC: 9.1 MCG/ML (ref 5–20)
WBC NRBC COR # BLD: 9.82 10*3/MM3 (ref 3.4–10.8)

## 2023-05-01 PROCEDURE — 85025 COMPLETE CBC W/AUTO DIFF WBC: CPT | Performed by: INTERNAL MEDICINE

## 2023-05-01 PROCEDURE — 25010000002 PIPERACILLIN SOD-TAZOBACTAM PER 1 G: Performed by: STUDENT IN AN ORGANIZED HEALTH CARE EDUCATION/TRAINING PROGRAM

## 2023-05-01 PROCEDURE — 25010000002 VANCOMYCIN 5 G RECONSTITUTED SOLUTION

## 2023-05-01 PROCEDURE — 80048 BASIC METABOLIC PNL TOTAL CA: CPT | Performed by: INTERNAL MEDICINE

## 2023-05-01 PROCEDURE — 99232 SBSQ HOSP IP/OBS MODERATE 35: CPT | Performed by: INTERNAL MEDICINE

## 2023-05-01 PROCEDURE — 82948 REAGENT STRIP/BLOOD GLUCOSE: CPT

## 2023-05-01 PROCEDURE — 96372 THER/PROPH/DIAG INJ SC/IM: CPT

## 2023-05-01 PROCEDURE — 80202 ASSAY OF VANCOMYCIN: CPT | Performed by: INTERNAL MEDICINE

## 2023-05-01 PROCEDURE — 63710000001 INSULIN ASPART PER 5 UNITS: Performed by: INTERNAL MEDICINE

## 2023-05-01 PROCEDURE — 25010000002 VANCOMYCIN 5 G RECONSTITUTED SOLUTION: Performed by: STUDENT IN AN ORGANIZED HEALTH CARE EDUCATION/TRAINING PROGRAM

## 2023-05-01 PROCEDURE — 63710000001 INSULIN DETEMIR PER 5 UNITS: Performed by: INTERNAL MEDICINE

## 2023-05-01 PROCEDURE — 63710000001 INSULIN ASPART PER 5 UNITS: Performed by: STUDENT IN AN ORGANIZED HEALTH CARE EDUCATION/TRAINING PROGRAM

## 2023-05-01 PROCEDURE — 25010000002 VANCOMYCIN 5 G RECONSTITUTED SOLUTION: Performed by: INTERNAL MEDICINE

## 2023-05-01 PROCEDURE — 25010000002 ENOXAPARIN PER 10 MG: Performed by: INTERNAL MEDICINE

## 2023-05-01 RX ORDER — LEVOTHYROXINE SODIUM 88 UG/1
88 TABLET ORAL
Status: DISCONTINUED | OUTPATIENT
Start: 2023-05-02 | End: 2023-05-03 | Stop reason: HOSPADM

## 2023-05-01 RX ORDER — LEVOTHYROXINE SODIUM 88 UG/1
88 TABLET ORAL DAILY
COMMUNITY

## 2023-05-01 RX ORDER — INSULIN ASPART 100 [IU]/ML
5 INJECTION, SOLUTION INTRAVENOUS; SUBCUTANEOUS
Status: DISCONTINUED | OUTPATIENT
Start: 2023-05-01 | End: 2023-05-03 | Stop reason: HOSPADM

## 2023-05-01 RX ORDER — PANTOPRAZOLE SODIUM 40 MG/1
40 TABLET, DELAYED RELEASE ORAL DAILY
Status: DISCONTINUED | OUTPATIENT
Start: 2023-05-01 | End: 2023-05-03 | Stop reason: HOSPADM

## 2023-05-01 RX ADMIN — TAZOBACTAM SODIUM AND PIPERACILLIN SODIUM 3.38 G: 375; 3 INJECTION, SOLUTION INTRAVENOUS at 00:21

## 2023-05-01 RX ADMIN — Medication 10 ML: at 20:04

## 2023-05-01 RX ADMIN — INSULIN ASPART 4 UNITS: 100 INJECTION, SOLUTION INTRAVENOUS; SUBCUTANEOUS at 07:02

## 2023-05-01 RX ADMIN — TAZOBACTAM SODIUM AND PIPERACILLIN SODIUM 3.38 G: 375; 3 INJECTION, SOLUTION INTRAVENOUS at 17:18

## 2023-05-01 RX ADMIN — VANCOMYCIN HYDROCHLORIDE 1500 MG: 5 INJECTION, POWDER, LYOPHILIZED, FOR SOLUTION INTRAVENOUS at 17:18

## 2023-05-01 RX ADMIN — LEVOTHYROXINE SODIUM 75 MCG: 0.07 TABLET ORAL at 05:50

## 2023-05-01 RX ADMIN — VANCOMYCIN HYDROCHLORIDE 1250 MG: 5 INJECTION, POWDER, LYOPHILIZED, FOR SOLUTION INTRAVENOUS at 05:52

## 2023-05-01 RX ADMIN — INSULIN ASPART 5 UNITS: 100 INJECTION, SOLUTION INTRAVENOUS; SUBCUTANEOUS at 10:20

## 2023-05-01 RX ADMIN — ATORVASTATIN CALCIUM 40 MG: 40 TABLET, FILM COATED ORAL at 09:49

## 2023-05-01 RX ADMIN — Medication 10 ML: at 10:10

## 2023-05-01 RX ADMIN — INSULIN ASPART 5 UNITS: 100 INJECTION, SOLUTION INTRAVENOUS; SUBCUTANEOUS at 12:10

## 2023-05-01 RX ADMIN — TAZOBACTAM SODIUM AND PIPERACILLIN SODIUM 3.38 G: 375; 3 INJECTION, SOLUTION INTRAVENOUS at 09:51

## 2023-05-01 RX ADMIN — INSULIN DETEMIR 30 UNITS: 100 INJECTION, SOLUTION SUBCUTANEOUS at 20:03

## 2023-05-01 RX ADMIN — ENOXAPARIN SODIUM 40 MG: 100 INJECTION SUBCUTANEOUS at 20:03

## 2023-05-01 RX ADMIN — INSULIN ASPART 2 UNITS: 100 INJECTION, SOLUTION INTRAVENOUS; SUBCUTANEOUS at 17:17

## 2023-05-01 RX ADMIN — INSULIN ASPART 2 UNITS: 100 INJECTION, SOLUTION INTRAVENOUS; SUBCUTANEOUS at 12:10

## 2023-05-01 RX ADMIN — PANTOPRAZOLE SODIUM 40 MG: 40 TABLET, DELAYED RELEASE ORAL at 09:49

## 2023-05-01 RX ADMIN — INSULIN ASPART 5 UNITS: 100 INJECTION, SOLUTION INTRAVENOUS; SUBCUTANEOUS at 17:18

## 2023-05-01 NOTE — PROGRESS NOTES
[facility] HOSPITALIST    PROGRESS NOTE    Name:  Seb Pettit   Age:  62 y.o.  Sex:  male  :  1960  MRN:  [id number]   Visit Number:  [id number]  Admission Date:  2023  Date Of Service:  23  Primary Care Physician:  Amado Phillips PA     LOS: 2 days :    Chief Complaint:      Left foot pain and swelling    Subjective:    Patient seen and evaluated this morning.  Sitting on the side of bed eating breakfast. Wife at bedside. No acute events overnight per nursing staff.     Hospital Course:    Patient is a 62-year-old man with past medical history of type 2 diabetes, dyslipidemia, hypothyroidism, GERD, peripheral neuropathy.  Presented to [facility] ED on 2023 with concern for high blood sugar and left foot pain.  Said that about 6 weeks prior he stepped on a piece of glass and has had a foot ulcer since then, has been following with podiatry in [place].    ED summary: Afebrile, vital signs stable on room air.  Blood glucose 320, sodium 132.  CRP 13.  Procalcitonin negative.  Leukocytosis 17.  Sed rate 64.  Blood cultures collected.  Wound culture collected.  XR left foot per ED interpretation soft tissue swelling noted to second toe, no evidence of osteomyelitis.      Review of Systems:     All systems were reviewed and negative except as mentioned in subjective, assessment and plan.    Vital Signs:    Temp:  [97.9 °F (36.6 °C)-98.8 °F (37.1 °C)] 98.4 °F (36.9 °C)  Heart Rate:  [] 115  Resp:  [16-18] 16  BP: (114-155)/(90-99) 149/98    Intake and output:    I/O last 3 completed shifts:  In: 840 [P.O.:840]  Out: 1775 [Urine:1775]  I/O this shift:  In: 650 [P.O.:600; IV Piggyback:50]  Out: -     Physical Examination:  Examined again 23    General Appearance:  Alert and cooperative.    Head:  Atraumatic and normocephalic.   Eyes: Conjunctivae and sclerae normal, no icterus. No pallor.   Throat: No oral lesions, no thrush, oral mucosa moist.   Neck: Supple, trachea  midline, no thyromegaly.   Lungs:   Breath sounds heard bilaterally equally.  No wheezing or crackles. No Pleural rub or bronchial breathing.   Heart:  Normal S1 and S2, no murmur, no gallop, no rub. No JVD.   Abdomen:   Normal bowel sounds, no masses, no organomegaly. Soft, nontender, nond istended, no rebound tenderness.   Extremities: Supple, no edema, circumferential ulcer to left foot with associated cellulitis and edema especially of the second metatarsal   Skin: No bleeding or rash.   Neurologic: Alert and oriented x 3. No facial asymmetry. Moves all four limbs. No tremors.      Laboratory results:    Results from last 7 days   Lab Units 05/01/23  0530 04/30/23  0553 04/29/23  0617 04/28/23  1948   SODIUM mmol/L 138 139 130* 132*   POTASSIUM mmol/L 3.7 3.5 3.9 3.6   CHLORIDE mmol/L 102 102 98 96*   CO2 mmol/L 28.3 27.4 23.9 23.0   BUN mg/dL 12 12 12 17   CREATININE mg/dL 0.80 0.84 0.74* 0.84   CALCIUM mg/dL 9.1 8.9 8.4* 8.9   BILIRUBIN mg/dL  --   --   --  0.9   ALK PHOS U/L  --   --   --  130*   ALT (SGPT) U/L  --   --   --  17   AST (SGOT) U/L  --   --   --  16   GLUCOSE mg/dL 264* 251* 285* 320*     Results from last 7 days   Lab Units 05/01/23  0530 04/30/23  0553 04/29/23  0617   WBC 10*3/mm3 9.82 10.81* 14.31*   HEMOGLOBIN g/dL 13.5 14.0 13.6   HEMATOCRIT % 39.7 41.0 40.2   PLATELETS 10*3/mm3 298 257 239             Results from last 7 days   Lab Units 04/28/23 2048 04/28/23 2042 04/28/23 2039   BLOODCX  No growth at 2 days No growth at 2 days  --    WOUNDCX   --   --  Moderate growth (3+) Staphylococcus aureus, MRSA*  Light growth (2+) Normal Skin Lynn     No results for input(s): PHART, SWL6HXY, PO2ART, BWK0ZFX, BASEEXCESS in the last 8760 hours.   I have reviewed the patient's laboratory results.    Radiology results:    CT Lower Extremity Left Without Contrast    Result Date: 4/29/2023  CT SCAN OF THE RIGHT FOOT WITHOUT CONTRAST 4/29/2023 1:19 PM  HISTORY: Foot swelling, osteomyelitis suspected   COMPARISON: None.  PROCEDURE: Axial images obtained through the right foot. Coronal and sagittal reformatted images were obtained. This study was performed with techniques to keep radiation doses as low as reasonably achievable, (ALARA). Individualized dose reduction techniques using automated exposure control or adjustment of mA and/or kV according to the patient size were employed.  FINDINGS: Small soft tissue ulcer noted underlying the second metatarsophalangeal joint. There is no osteolysis/bone destruction or periosteal reaction to suggest osteomyelitis. No fracture or dislocation seen. Calcaneal spurs/enthesophytes noted with chronic thickening of the plantar fascia. Arterial calcifications are noted. Diffuse soft tissue swelling. No definite fluid collection.      Impression: Plantar soft tissue ulcer underlying the second MTP joint. No CT evidence of osteomyelitis. Would recommend MRI however.  This report was signed and finalized on 4/29/2023 1:44 PM by Dr Ja Powell DO.    I have reviewed the patient's radiology reports.    Medication Review:     I have reviewed the patient's active and prn medications.     Problem List:      Cellulitis in diabetic foot    T2DM (type 2 diabetes mellitus)    Hypothyroid    Diabetes mellitus with neuropathy    Dyslipidemia      Assessment:    1. Nonhealing diabetic left foot ulcer with associated cellulitis, POA  2. Poorly managed insulin-dependent type 2 diabetes  3. Hypertension  4. Hyperlipidemia  5. Hypothyroidism  6. Diabetic peripheral neuropathy  7. GERD    Plan:    Continue to monitor patient in the hospital.    Nonhealing diabetic left foot ulcer with associated cellulitis  - Continue broad-spectrum empiric antibiotics with vancomycin and Zosyn.  Follow cultures, positive for MRSA.  Wound care consult.  No radiographic evidence of osteomyelitis on CT of the left lower extremity.  Podiatry consultation, Dr Sorensen.     Poorly managed type 2 diabetes  - Levemir,  Accu-Cheks, sliding scale insulin.  Add meal time coverage.   -Diabetic educator.    Further orders as clinical course dictates.    DVT Prophylaxis: Lovenox  Code Status: Full  Diet: Cardiac/diabetic  Discharge Plan: Pending    Wenceslao Babb DO  05/01/23  10:25 EDT    Dictated utilizing Dragon dictation.

## 2023-05-01 NOTE — PLAN OF CARE
Goal Outcome Evaluation:  Plan of Care Reviewed With: patient        Progress: no change  Outcome Evaluation: PT LYING IN BED RESTING COMFORTABLY AT THIS TIME. NO ACUTE EVENTS OVERNIGHT. VSS. WILL CONTINUE TO MONITOR.

## 2023-05-01 NOTE — PROGRESS NOTES
Pharmacy Consult-Vancomycin Dosing    Seb Pettit is a  62 y.o. male receiving vancomycin therapy.     Indication: Bone and/or Joint Infection  Consulting Provider: Kerley     Goal AUC: 400-600 mg/:L*hr    Current Antimicrobial Therapy  Anti-Infectives (From admission, onward)      Ordered     Dose/Rate Route Frequency Start Stop    04/30/23 1420  vancomycin 1250 mg/250 mL 0.9% NS IVPB ([facility])        Ordering Provider: Wenceslao Babb DO    1,250 mg  over 75 Minutes Intravenous Every 12 Hours 04/30/23 1800 05/07/23 0559    04/30/23 1420  Pharmacy to dose vancomycin        Ordering Provider: Wenceslao Babb DO     Does not apply Continuous PRN 04/30/23 1420 05/06/23 1419    04/29/23 0206  piperacillin-tazobactam (ZOSYN) 3.375 g in iso-osmotic dextrose 50 ml (premix)        Ordering Provider: Kerley, Brian Joseph, DO    3.375 g  over 4 Hours Intravenous Every 8 Hours 04/29/23 0900 05/06/23 0859    04/29/23 0206  piperacillin-tazobactam (ZOSYN) 3.375 g in iso-osmotic dextrose 50 ml (premix)        Ordering Provider: Kerley, Brian Joseph, DO    3.375 g  over 30 Minutes Intravenous Once 04/29/23 0300 04/29/23 0240    04/29/23 0149  Pharmacy to Dose Zosyn        Ordering Provider: Kerley, Brian Joseph, DO     Does not apply Continuous PRN 04/29/23 0149 05/06/23 0148    04/28/23 2051  cefepime (MAXIPIME) IVPB 2 g/50ml D5W (premix)        Ordering Provider: Abhijit Monae APRN    2 g Intravenous Once 04/28/23 2053 04/28/23 2150    04/28/23 2051  vancomycin 1750 mg/500 mL 0.9% NS IVPB ([facility])        Ordering Provider: Abhijit Monae APRN    20 mg/kg × 90.7 kg Intravenous Once 04/28/23 2053 04/29/23 0030            Labs  Results from last 7 days   Lab Units 05/01/23  0530 04/30/23  0553 04/29/23  0617   WBC 10*3/mm3 9.82 10.81* 14.31*   CREATININE mg/dL 0.80 0.84 0.74*      Estimated Creatinine Clearance: 128.1 mL/min (by C-G formula based on SCr of 0.8 mg/dL).  Temp Readings from Last 1 Encounters:   05/01/23 98.4 °F  (36.9 °C) (Oral)       Microbiology Culture results  Microbiology Results (last 10 days)       Procedure Component Value - Date/Time    MRSA Screen, PCR (Inpatient) - Swab, Nares [[id number]]  (Normal) Collected: 04/29/23 0207    Lab Status: Final result Specimen: Swab from Nares Updated: 04/29/23 1335     MRSA PCR No MRSA Detected    Narrative:      The negative predictive value of this diagnostic test is high and should only be used to consider de-escalating anti-MRSA therapy. A positive result may indicate colonization with MRSA and must be correlated clinically.    Blood Culture With JESSICA - Blood, Hand, Left [[id number]]  (Normal) Collected: 04/28/23 2048    Lab Status: Preliminary result Specimen: Blood from Hand, Left Updated: 04/30/23 2100     Blood Culture No growth at 2 days    Blood Culture With JESSICA - Blood, Arm, Left [[id number]]  (Normal) Collected: 04/28/23 2042    Lab Status: Preliminary result Specimen: Blood from Arm, Left Updated: 04/30/23 2100     Blood Culture No growth at 2 days    Wound Culture - Wound, Foot, Left [[id number]]  (Abnormal)  (Susceptibility) Collected: 04/28/23 2039    Lab Status: Final result Specimen: Wound from Foot, Left Updated: 05/01/23 0826     Wound Culture Moderate growth (3+) Staphylococcus aureus, MRSA     Comment:   Methicillin resistant Staphylococcus aureus, Patient may be an isolation risk.         Light growth (2+) Normal Skin Lynn     Gram Stain Rare (1+) Gram positive cocci in pairs    Susceptibility        Staphylococcus aureus, MRSA      KEYONA      Clindamycin Susceptible      Erythromycin Resistant      Inducible Clindamycin Resistance Negative      Oxacillin Resistant      Rifampin Susceptible      Tetracycline Susceptible      Trimethoprim + Sulfamethoxazole Susceptible      Vancomycin Susceptible                       Susceptibility Comments       Staphylococcus aureus, MRSA    This isolate does not demonstrate inducible clindamycin resistance in vitro.    This  "isolate does not demonstrate inducible clindamycin resistance in vitro.                         Evaluation of Dosing     Last Dose Received in the ED/Outside Facility: No  Is Patient on Dialysis or Renal Replacement: No    Ht - 188 cm (74\")  Wt - 92.2 kg (203 lb 4.2 oz)    Evaluation of Level    Results from last 7 days   Lab Units 04/29/23  0617   VANCOMYCIN RM mcg/mL 8.20             Results from last 7 days   Lab Units 05/01/23  0530   VANCOMYCIN TR mcg/mL 9.10       [employer] AUC Calculation    Current AUC:   384 mg/L*hr    Predicted Steady State AUC on Current Dose: 365 mg/L*hr  _________________________________    Predicted Steady State AUC on New Dose: 438 mg/L*hr    Assessment/Plan    Pharmacy to dose vancomycin for bone and/or joint infection. Goal -600 mg/L*hr.  Patient currently receiving Vancomycin 1250 mg IV Q12H.  Vancomycin trough level, collected ~ 30 minutes prior to dose, resulted at 9.1 mcg/mL. Renal function stable.  Will adjust regimen to vancomycin 1500mg IV q12h.  Assess clearance by vancomycin random level on 5/2 @ 1200.  Pharmacy will continue to monitor renal function, cultures and sensitivities, and clinical status to adjust regimen as necessary.    Thanks,     Twan Deutsch, PharmD  5/1/2023 10:40 EDT    " Nsaids Pregnancy And Lactation Text: These medications are considered safe up to 30 weeks gestation. It is excreted in breast milk.

## 2023-05-01 NOTE — CASE MANAGEMENT/SOCIAL WORK
Discharge Planning Assessment  [facility]     Patient Name: Seb Pettit  MRN: [id number]  Today's Date: 5/1/2023    Admit Date: 4/28/2023    Plan: DCP   Discharge Needs Assessment     Row Name 05/01/23 1349       Living Environment    People in Home spouse    Current Living Arrangements home    Potentially Unsafe Housing Conditions none    Primary Care Provided by self;spouse/significant other    Provides Primary Care For no one    Family Caregiver if Needed spouse    Quality of Family Relationships involved    Able to Return to Prior Arrangements yes       Resource/Environmental Concerns    Resource/Environmental Concerns none    Transportation Concerns none       Transition Planning    Patient/Family Anticipates Transition to home    Patient/Family Anticipated Services at Transition none    Transportation Anticipated family or friend will provide       Discharge Needs Assessment    Readmission Within the Last 30 Days no previous admission in last 30 days    Equipment Currently Used at Home crutches    Concerns to be Addressed discharge planning    Anticipated Changes Related to Illness none    Equipment Needed After Discharge other (see comments)  TBD    Discharge Facility/Level of Care Needs other (see comments)  TBD               Discharge Plan     Row Name 05/01/23 1350       Plan    Plan DCP    Patient/Family in Agreement with Plan yes    Plan Comments SW met with pt at bedside for discharge planning. Pt lives with spouse in [facility]. Pt states hes is normally independent with ADL's at baseline. Pt uses crutches as needed to help get around due to wound. Pt does not use home O2. Pt denied HH/OP services. Pt confirmed PCP and medical coverage. Pt states goal is to return home at d/c with spouse to provide transportation. SW/CM will continue to follow for dc needs.    Final Discharge Disposition Code 30 - still a patient              Continued Care and Services - Admitted Since 4/28/2023    Coordination has  not been started for this encounter.          Demographic Summary     Row Name 05/01/23 1348       General Information    Admission Type inpatient    Arrived From home    Referral Source admission list    Reason for Consult discharge planning    Preferred Language English               Functional Status     Row Name 05/01/23 1348       Functional Status, IADL    Medications independent    Meal Preparation independent    Housekeeping independent    Laundry independent    Shopping independent       Mental Status Summary    Recent Changes in Mental Status/Cognitive Functioning unable to assess       Employment/    Employment Status unemployed    Current or Previous Occupation unable to assess               Psychosocial     Row Name 05/01/23 1340       Developmental Stage (Eriksson's)    Developmental Stage Stage 7 (35-65 years/Middle Adulthood) Generativity vs. Stagnation               Abuse/Neglect    No documentation.                Legal    No documentation.                Substance Abuse    No documentation.                Patient Forms    No documentation.                   FABIENNE Izaguirre

## 2023-05-01 NOTE — CONSULTS
Nutrition Services    Patient Name:  Seb Pettit  YOB: 1960  MRN: [id number]  Admit Date:  4/28/2023    Per Diabetes Educator Consult provided Diabetes Care / Education. Review A1c past 3 months Blood Glucose average 260 A1C:10.70. Patient stated he has a working blood glucose meter however only checked blood glucose occasionally. Stated he was non compliant to checking blood glucose and taking insulin as prescribed. Reviewed with patient importance of checking blood glucose and BG meter basics, did not have any questions, review insulin teach with vial saline, syringe,and pen, patient demonstrated accurately. Provided handouts, Stated he did not have any questions. Review Diabetes Plate Method, foods that increase blood glucose, importance of eating 3 meals per day with bedtime snack. Provided out patient diabetes contact information per scheduling and appointment. Will follow -up as needed per this visit.    Electronically signed by:  Zenaida Richards RD  05/01/23 16:50 EDT

## 2023-05-01 NOTE — CONSULTS
Consults    Patient Identification:  Name:  Seb Pettit  Age:  62 y.o.  Sex:  male  :  1960  MRN:  [id number]  Visit Number:  [id number]  Primary care provider:  Amado Phillips PA    Chief complaint: left foot ulcer    History of presenting illness:  Patient is a 62-year-old man with past medical history of type 2 diabetes, dyslipidemia, hypothyroidism, GERD, peripheral neuropathy. He presented to the ED with complaints of high blood sugar and a left foot wound. He states he stepped on a piece of glass about two months ago. He saw a podiatrist and it took a long time to heal, but it eventually closed over and stopped draining. He woke up a few days he came to the hospital and noticed drainage to the wound on his foot and his 2nd toe dark and swollen.  ---------------------------------------------------------------------------------------------------------------------  Review of Systems:   Constitution: No chills, no rigors, no unexplained weight loss or weight gain  Respiratory: No cough, no hemoptysis  Cardiovascular: regular rate and rhythm  Gastrointestinal: No nausea, no vomiting, no hematemesis, no diarrhea or constipation, no melena  Integument: No pruritis and  no skin rash  Musculoskeletal: No joint pain, joint stiffness, joint swelling, muscle pain, muscle weakness and neck pain  Neurological: No dizziness, headaches, light headedness, seizures and vertigo  Endocrine: No frequent urination and nocturia, temperature intolerance, weight gain, unintended and weight loss, unintended  ---------------------------------------------------------------------------------------------------------------------   Past History:  History reviewed. No pertinent family history.  Past Medical History:   Diagnosis Date   • Diabetes mellitus      Past Surgical History:   Procedure Laterality Date   • COLONOSCOPY     • ENDOSCOPY       Social History     Socioeconomic History   • Marital status:     Tobacco Use   • Smoking status: Never   • Smokeless tobacco: Never   Vaping Use   • Vaping Use: Never used   Substance and Sexual Activity   • Alcohol use: Never   • Drug use: Never   • Sexual activity: Defer     ---------------------------------------------------------------------------------------------------------------------   Allergies:  Patient has no known allergies.  ---------------------------------------------------------------------------------------------------------------------   Prior to Admission Medications     Prescriptions Last Dose Informant Patient Reported? Taking?    acetaminophen (Tylenol 8 Hour) 650 MG 8 hr tablet Unknown  No No    Take 1 tablet by mouth Every 8 (Eight) Hours As Needed for Mild Pain .    atorvastatin (LIPITOR) 40 MG tablet 4/28/2023  Yes No    Take 1 tablet by mouth Every Night.    bisacodyl (Dulcolax) 5 MG EC tablet Not Taking  No No    Take 2 @ 3pm, 2 @ 7 pm day prior to colonoscopy    Patient not taking:  Reported on 5/1/2023    cyclobenzaprine (FLEXERIL) 5 MG tablet Not Taking  No No    Take 1 tablet by mouth At Night As Needed for Muscle Spasms.    Patient not taking:  Reported on 5/1/2023    glimepiride (AMARYL) 1 MG tablet 4/28/2023  Yes No    Take 4 tablets by mouth Every Morning Before Breakfast.    GNP Vitamin D Super Strength 125 MCG (5000 UT) tablet Not Taking  Yes No    Take 1 tablet by mouth 2 (Two) Times a Day.    Patient not taking:  Reported on 5/1/2023    ibuprofen (ADVIL,MOTRIN) 600 MG tablet Unknown  No No    Take 1 tablet by mouth Every 8 (Eight) Hours As Needed for Mild Pain .    levothyroxine (SYNTHROID, LEVOTHROID) 75 MCG tablet Not Taking  Yes No    Take 75 mcg by mouth Daily.    Patient not taking:  Reported on 5/1/2023    levothyroxine (SYNTHROID, LEVOTHROID) 88 MCG tablet 4/28/2023  Yes No    Take 1 tablet by mouth Daily.    meloxicam (MOBIC) 15 MG tablet Not Taking  Yes No    TAKE 1 TABLET BY MOUTH DAILY; FOR LEG PAIN    Patient not taking:   Reported on 5/1/2023    metFORMIN (GLUCOPHAGE) 1000 MG tablet 4/28/2023  Yes No    omeprazole (priLOSEC) 20 MG capsule 4/28/2023  Yes No    Take 1 capsule by mouth 2 (Two) Times a Day.    ondansetron ODT (ZOFRAN-ODT) 4 MG disintegrating tablet Not Taking  No No    Place 1 tablet on the tongue Every 8 (Eight) Hours As Needed for Nausea or Vomiting.    Patient not taking:  Reported on 5/1/2023        Hospital Meds:  atorvastatin, 40 mg, Oral, Daily  enoxaparin, 40 mg, Subcutaneous, Nightly  Insulin Aspart, 0-9 Units, Subcutaneous, TID AC  Insulin Aspart, 5 Units, Subcutaneous, TID With Meals  insulin detemir, 30 Units, Subcutaneous, Nightly  levothyroxine, 75 mcg, Oral, Q AM  pantoprazole, 40 mg, Oral, Daily  piperacillin-tazobactam, 3.375 g, Intravenous, Q8H  sodium chloride, 10 mL, Intravenous, Q12H  vancomycin, 1,500 mg, Intravenous, Q12H      Pharmacy to dose vancomycin,   Pharmacy to Dose Zosyn,       ---------------------------------------------------------------------------------------------------------------------   Vital Signs:  Temp:  [97.9 °F (36.6 °C)-98.8 °F (37.1 °C)] 98.4 °F (36.9 °C)  Heart Rate:  [] 115  Resp:  [16-18] 16  BP: (114-155)/(95-99) 149/98      04/29/23  0158 04/30/23  0347 05/01/23  0407   Weight: 91.1 kg (200 lb 13.4 oz) 91.1 kg (200 lb 13.4 oz) 92.2 kg (203 lb 4.2 oz)     Body mass index is 26.1 kg/m².  ---------------------------------------------------------------------------------------------------------------------   Physical exam:  Vascular:1/4 DP and 1/4 PT right and left foot. Brisk capillary refill all digits except absent capillary refill distal left 2nd with hyperpigmentation. Diminished hair growth. Skin temperature warm to cool gradient proximal leg to foot. Varicosities absent.    Neurological: Vibratory, light touch, and proprioception absent bilateral feet.    Dermatological: Left 2nd toe moderate edema, ulceration lateral toe 2cm x 1cm x 0.1cm, with moderate  serosanguinous drainage; hyperpigmentation from distal tip to PIPJ  Ulcer:   Location: plantar left 2nd metatarsal head  Size: 1.4cm x 1.7cm x 0.3cm; tunneling 1cm centrally, not to bone  Base: 20/80 granular/fibrotic  Drainage: moderate serosanguinous  Periwound: Hyperkeratosis, ascending cellulitis, edema to midfoot    Musculoskeletal: MMT 5/5 GS/FHL/TA. No pain on palpation. All compartments soft and compressible. No pain or crepitus with ROM bilateral foot and ankle. Mild Gastrosoleus contracture. Hammertoes 2-5 bilateral foot. No gross deformity of the bilateral lower extremities    ---------------------------------------------------------------------------------------------------------------------   Results from last 7 days   Lab Units 05/01/23 0530 04/30/23 0553 04/29/23 0617 04/28/23 1948 04/28/23 1948 04/28/23 1940   CRP mg/dL  --   --   --   --  13.21*  --    LACTATE mmol/L  --   --   --   --   --  1.7   WBC 10*3/mm3 9.82 10.81* 14.31*   < > 17.05*  --    HEMOGLOBIN g/dL 13.5 14.0 13.6   < > 14.5  --    HEMATOCRIT % 39.7 41.0 40.2   < > 41.6  --    MCV fL 82.2 82.2 82.2   < > 80.8  --    MCHC g/dL 34.0 34.1 33.8   < > 34.9  --    PLATELETS 10*3/mm3 298 257 239   < > 257  --     < > = values in this interval not displayed.         Results from last 7 days   Lab Units 05/01/23 0530 04/30/23 0553 04/29/23 0617 04/28/23 1948   SODIUM mmol/L 138 139 130* 132*   POTASSIUM mmol/L 3.7 3.5 3.9 3.6   CHLORIDE mmol/L 102 102 98 96*   CO2 mmol/L 28.3 27.4 23.9 23.0   BUN mg/dL 12 12 12 17   CREATININE mg/dL 0.80 0.84 0.74* 0.84   CALCIUM mg/dL 9.1 8.9 8.4* 8.9   GLUCOSE mg/dL 264* 251* 285* 320*   ALBUMIN g/dL  --   --   --  3.6   BILIRUBIN mg/dL  --   --   --  0.9   ALK PHOS U/L  --   --   --  130*   AST (SGOT) U/L  --   --   --  16   ALT (SGPT) U/L  --   --   --  17   Estimated Creatinine Clearance: 128.1 mL/min (by C-G formula based on SCr of 0.8 mg/dL).  No results found for: AMMONIA          Lab Results    Component Value Date    HGBA1C 10.70 (H) 04/28/2023     Lab Results   Component Value Date    TSH 2.610 04/28/2023    FREET4 1.00 02/23/2023     No results found for: PREGTESTUR, PREGSERUM, HCG, HCGQUANT  Pain Management Panel          View : No data to display.                    Blood Culture   Date Value Ref Range Status   04/28/2023 No growth at 2 days  Preliminary   04/28/2023 No growth at 2 days  Preliminary     No results found for: URINECX  Wound Culture   Date Value Ref Range Status   04/28/2023 Moderate growth (3+) Staphylococcus aureus, MRSA (A)  Final     Comment:       Methicillin resistant Staphylococcus aureus, Patient may be an isolation risk.   04/28/2023 Light growth (2+) Normal Skin Lynn  Final     No results found for: STOOLCX      ---------------------------------------------------------------------------------------------------------------------   Imaging Results (Last 7 Days)     Procedure Component Value Units Date/Time    CT Lower Extremity Left Without Contrast [[id number]] Collected: 04/29/23 1337     Updated: 04/29/23 1346    Narrative:      CT SCAN OF THE RIGHT FOOT WITHOUT CONTRAST 4/29/2023 1:19 PM      HISTORY: Foot swelling, osteomyelitis suspected      COMPARISON: None.     PROCEDURE: Axial images obtained through the right foot. Coronal and  sagittal reformatted images were obtained. This study was performed with  techniques to keep radiation doses as low as reasonably achievable,  (ALARA). Individualized dose reduction techniques using automated  exposure control or adjustment of mA and/or kV according to the patient  size were employed.     FINDINGS: Small soft tissue ulcer noted underlying the second  metatarsophalangeal joint. There is no osteolysis/bone destruction or  periosteal reaction to suggest osteomyelitis. No fracture or dislocation  seen. Calcaneal spurs/enthesophytes noted with chronic thickening of the  plantar fascia. Arterial calcifications are noted. Diffuse  soft tissue  swelling. No definite fluid collection.       Impression:      Plantar soft tissue ulcer underlying the second MTP joint. No CT  evidence of osteomyelitis. Would recommend MRI however.     This report was signed and finalized on 4/29/2023 1:44 PM by Dr Ja Powell DO.    XR Foot 3+ View Left [[id number]] Collected: 04/29/23 0655     Updated: 04/29/23 0659    Narrative:      LEFT FOOT     HISTORY: Left foot pain and redness.      FINDINGS:  A three view exam demonstrates no acute fracture or  dislocation. The joint spaces appear normal. Prominent plantar calcaneal  spur. Arterial calcifications. No bone destruction.       Impression:      No acute fracture.               This report was signed and finalized on 4/29/2023 6:57 AM by Dr Ja Powell DO.        ----------------------------------------------------------------------------------------------------------------------  Assessment and Plan:  Left foot ulceration plantar 2nd metatarsal head and left 2nd toe with associated cellulitis.    -Reviewed imaging - no osteomyelitis on xray or CT    -Continue IV antibiotics with MRSA coverage for a total of 5 days treatment then switch to 14 days of PO  -Betadine daily to toe and foot ulcers  -Discussed treatment with patient. He is at high risk for left 2nd toe amputation, not emergent as there is no bone involvement. Will use betadine to allow toe to demarcate.    Thank you for the consult.    Mari Sorensen DPM  05/01/23  13:02 EDT     involvement. Will use betadine to allow toe to demarcate.    Thank you for the consult.    Mari Sorensen DPM  05/01/23  13:02 EDT

## 2023-05-01 NOTE — PLAN OF CARE
Goal Outcome Evaluation:      Consult for ACP as received.  Pt was sleeping when attempted visits were made. No family was present.

## 2023-05-01 NOTE — PLAN OF CARE
Goal Outcome Evaluation:  Plan of Care Reviewed With: patient        Progress: no change  Outcome Evaluation: Patient saw podiatrist and diabetic educator today. FSBG monitored throughout day and coverage given as needed. Wound care performed this afternoon. Will continue to monitor.

## 2023-05-01 NOTE — NURSING NOTE
Not seen for wound consult due to podiatrist Dr. Sorensen is anticipated to see patient today. Discussed with Deepthi VAZQUEZ. If WOCN is requested following Dr. Sorensen' visit please reconsult.

## 2023-05-02 LAB
ANION GAP SERPL CALCULATED.3IONS-SCNC: 9.4 MMOL/L (ref 5–15)
BASOPHILS # BLD AUTO: 0.04 10*3/MM3 (ref 0–0.2)
BASOPHILS NFR BLD AUTO: 0.4 % (ref 0–1.5)
BUN SERPL-MCNC: 8 MG/DL (ref 8–23)
BUN/CREAT SERPL: 11 (ref 7–25)
CALCIUM SPEC-SCNC: 8.9 MG/DL (ref 8.6–10.5)
CHLORIDE SERPL-SCNC: 100 MMOL/L (ref 98–107)
CO2 SERPL-SCNC: 27.6 MMOL/L (ref 22–29)
CREAT SERPL-MCNC: 0.73 MG/DL (ref 0.76–1.27)
DEPRECATED RDW RBC AUTO: 37.2 FL (ref 37–54)
DEPRECATED RDW RBC AUTO: 37.4 FL (ref 37–54)
EGFRCR SERPLBLD CKD-EPI 2021: 104.2 ML/MIN/1.73
EOSINOPHIL # BLD AUTO: 0.27 10*3/MM3 (ref 0–0.4)
EOSINOPHIL NFR BLD AUTO: 2.7 % (ref 0.3–6.2)
ERYTHROCYTE [DISTWIDTH] IN BLOOD BY AUTOMATED COUNT: 12.3 % (ref 12.3–15.4)
ERYTHROCYTE [DISTWIDTH] IN BLOOD BY AUTOMATED COUNT: 12.3 % (ref 12.3–15.4)
GLUCOSE BLDC GLUCOMTR-MCNC: 120 MG/DL (ref 70–130)
GLUCOSE BLDC GLUCOMTR-MCNC: 123 MG/DL (ref 70–130)
GLUCOSE BLDC GLUCOMTR-MCNC: 168 MG/DL (ref 70–130)
GLUCOSE BLDC GLUCOMTR-MCNC: 340 MG/DL (ref 70–130)
GLUCOSE SERPL-MCNC: 142 MG/DL (ref 65–99)
HCT VFR BLD AUTO: 40.9 % (ref 37.5–51)
HCT VFR BLD AUTO: 41.8 % (ref 37.5–51)
HGB BLD-MCNC: 13.7 G/DL (ref 13–17.7)
HGB BLD-MCNC: 14.3 G/DL (ref 13–17.7)
IMM GRANULOCYTES # BLD AUTO: 0.06 10*3/MM3 (ref 0–0.05)
IMM GRANULOCYTES NFR BLD AUTO: 0.6 % (ref 0–0.5)
LYMPHOCYTES # BLD AUTO: 2.27 10*3/MM3 (ref 0.7–3.1)
LYMPHOCYTES NFR BLD AUTO: 22.6 % (ref 19.6–45.3)
MCH RBC QN AUTO: 28.5 PG (ref 26.6–33)
MCH RBC QN AUTO: 28.9 PG (ref 26.6–33)
MCHC RBC AUTO-ENTMCNC: 33.5 G/DL (ref 31.5–35.7)
MCHC RBC AUTO-ENTMCNC: 34.2 G/DL (ref 31.5–35.7)
MCV RBC AUTO: 84.6 FL (ref 79–97)
MCV RBC AUTO: 85 FL (ref 79–97)
MONOCYTES # BLD AUTO: 0.9 10*3/MM3 (ref 0.1–0.9)
MONOCYTES NFR BLD AUTO: 8.9 % (ref 5–12)
NEUTROPHILS NFR BLD AUTO: 6.52 10*3/MM3 (ref 1.7–7)
NEUTROPHILS NFR BLD AUTO: 64.8 % (ref 42.7–76)
NRBC BLD AUTO-RTO: 0 /100 WBC (ref 0–0.2)
PLATELET # BLD AUTO: 288 10*3/MM3 (ref 140–450)
PLATELET # BLD AUTO: 313 10*3/MM3 (ref 140–450)
PMV BLD AUTO: 9.3 FL (ref 6–12)
PMV BLD AUTO: 9.3 FL (ref 6–12)
POTASSIUM SERPL-SCNC: 4 MMOL/L (ref 3.5–5.2)
RBC # BLD AUTO: 4.81 10*6/MM3 (ref 4.14–5.8)
RBC # BLD AUTO: 4.94 10*6/MM3 (ref 4.14–5.8)
SODIUM SERPL-SCNC: 137 MMOL/L (ref 136–145)
VANCOMYCIN SERPL-MCNC: 19 MCG/ML (ref 5–40)
WBC NRBC COR # BLD: 10.06 10*3/MM3 (ref 3.4–10.8)
WBC NRBC COR # BLD: 9.64 10*3/MM3 (ref 3.4–10.8)

## 2023-05-02 PROCEDURE — 63710000001 INSULIN ASPART PER 5 UNITS: Performed by: STUDENT IN AN ORGANIZED HEALTH CARE EDUCATION/TRAINING PROGRAM

## 2023-05-02 PROCEDURE — 85025 COMPLETE CBC W/AUTO DIFF WBC: CPT | Performed by: INTERNAL MEDICINE

## 2023-05-02 PROCEDURE — 63710000001 INSULIN ASPART PER 5 UNITS: Performed by: INTERNAL MEDICINE

## 2023-05-02 PROCEDURE — 80048 BASIC METABOLIC PNL TOTAL CA: CPT

## 2023-05-02 PROCEDURE — 25010000002 ENOXAPARIN PER 10 MG: Performed by: INTERNAL MEDICINE

## 2023-05-02 PROCEDURE — 63710000001 INSULIN DETEMIR PER 5 UNITS: Performed by: INTERNAL MEDICINE

## 2023-05-02 PROCEDURE — 99232 SBSQ HOSP IP/OBS MODERATE 35: CPT | Performed by: STUDENT IN AN ORGANIZED HEALTH CARE EDUCATION/TRAINING PROGRAM

## 2023-05-02 PROCEDURE — 85027 COMPLETE CBC AUTOMATED: CPT

## 2023-05-02 PROCEDURE — 25010000002 VANCOMYCIN 5 G RECONSTITUTED SOLUTION: Performed by: STUDENT IN AN ORGANIZED HEALTH CARE EDUCATION/TRAINING PROGRAM

## 2023-05-02 PROCEDURE — 82948 REAGENT STRIP/BLOOD GLUCOSE: CPT

## 2023-05-02 PROCEDURE — 25010000002 PIPERACILLIN SOD-TAZOBACTAM PER 1 G: Performed by: STUDENT IN AN ORGANIZED HEALTH CARE EDUCATION/TRAINING PROGRAM

## 2023-05-02 PROCEDURE — 25010000002 VANCOMYCIN 5 G RECONSTITUTED SOLUTION

## 2023-05-02 PROCEDURE — 80202 ASSAY OF VANCOMYCIN: CPT

## 2023-05-02 PROCEDURE — 96372 THER/PROPH/DIAG INJ SC/IM: CPT

## 2023-05-02 RX ORDER — LEVOTHYROXINE SODIUM 88 UG/1
88 TABLET ORAL DAILY
Status: DISCONTINUED | OUTPATIENT
Start: 2023-05-02 | End: 2023-05-02

## 2023-05-02 RX ORDER — DOXYCYCLINE 100 MG/1
100 CAPSULE ORAL EVERY 12 HOURS SCHEDULED
Status: DISCONTINUED | OUTPATIENT
Start: 2023-05-03 | End: 2023-05-03 | Stop reason: HOSPADM

## 2023-05-02 RX ADMIN — TAZOBACTAM SODIUM AND PIPERACILLIN SODIUM 3.38 G: 375; 3 INJECTION, SOLUTION INTRAVENOUS at 00:55

## 2023-05-02 RX ADMIN — TAZOBACTAM SODIUM AND PIPERACILLIN SODIUM 3.38 G: 375; 3 INJECTION, SOLUTION INTRAVENOUS at 18:14

## 2023-05-02 RX ADMIN — LEVOTHYROXINE SODIUM 88 MCG: 88 TABLET ORAL at 06:31

## 2023-05-02 RX ADMIN — INSULIN DETEMIR 30 UNITS: 100 INJECTION, SOLUTION SUBCUTANEOUS at 20:00

## 2023-05-02 RX ADMIN — INSULIN ASPART 5 UNITS: 100 INJECTION, SOLUTION INTRAVENOUS; SUBCUTANEOUS at 08:52

## 2023-05-02 RX ADMIN — PANTOPRAZOLE SODIUM 40 MG: 40 TABLET, DELAYED RELEASE ORAL at 08:51

## 2023-05-02 RX ADMIN — ATORVASTATIN CALCIUM 40 MG: 40 TABLET, FILM COATED ORAL at 08:51

## 2023-05-02 RX ADMIN — INSULIN ASPART 5 UNITS: 100 INJECTION, SOLUTION INTRAVENOUS; SUBCUTANEOUS at 12:07

## 2023-05-02 RX ADMIN — VANCOMYCIN HYDROCHLORIDE 1500 MG: 5 INJECTION, POWDER, LYOPHILIZED, FOR SOLUTION INTRAVENOUS at 06:31

## 2023-05-02 RX ADMIN — Medication 10 ML: at 08:52

## 2023-05-02 RX ADMIN — INSULIN ASPART 2 UNITS: 100 INJECTION, SOLUTION INTRAVENOUS; SUBCUTANEOUS at 18:14

## 2023-05-02 RX ADMIN — TAZOBACTAM SODIUM AND PIPERACILLIN SODIUM 3.38 G: 375; 3 INJECTION, SOLUTION INTRAVENOUS at 08:52

## 2023-05-02 RX ADMIN — ENOXAPARIN SODIUM 40 MG: 100 INJECTION SUBCUTANEOUS at 20:00

## 2023-05-02 RX ADMIN — VANCOMYCIN HYDROCHLORIDE 1500 MG: 5 INJECTION, POWDER, LYOPHILIZED, FOR SOLUTION INTRAVENOUS at 18:14

## 2023-05-02 RX ADMIN — INSULIN ASPART 5 UNITS: 100 INJECTION, SOLUTION INTRAVENOUS; SUBCUTANEOUS at 18:14

## 2023-05-02 NOTE — PLAN OF CARE
Goal Outcome Evaluation:     Spoke with pt as he sat in a bedside recliner.  Pt was awake and conversational.  Pt stated he had not asked for help with ACP and knows that his wife would be the person to speak for him.  Pt was not interested in completing a Living Will, but I offered to be of assistance if he decided to designate a surrogate decision maker.

## 2023-05-02 NOTE — PROGRESS NOTES
Adult Nutrition  Assessment/PES    Patient Name:  Seb Pettit  YOB: 1960  MRN: [id number]  Admit Date:  4/28/2023    Assessment Date:  5/2/2023    Comments:    Pt with good PO intake, currently averaging 81.3% x 2 days, 4 meals. [employer] ordered BID for wound healing, pt receiving per RN this a.m. 5/2. Will continue current ONS and will F/U. Available PRN.      Reason for Assessment     Row Name 05/02/23 1051          Reason for Assessment    Reason For Assessment follow-up protocol                  Labs/Tests/Procedures/Meds     Row Name 05/02/23 1051          Labs/Procedures/Meds    Lab Results Reviewed reviewed        Medications    Pertinent Medications Reviewed reviewed, pertinent     Pertinent Medications Comments lipitor, lovenox, insulin, synthroid, protonix, NaCl                Physical Findings     Row Name 05/02/23 1052          Physical Findings    Overall Physical Appearance overweight, L foot DM ulcer, Yvon score 20                Estimated/Assessed Needs - Anthropometrics     Row Name 05/02/23 0500          Anthropometrics    Weight 94.9 kg (209 lb 3.5 oz)                Nutrition Prescription Ordered     Row Name 05/02/23 1052          Nutrition Prescription PO    Current PO Diet Regular     Fluid Consistency Thin     Common Modifiers Cardiac;Consistent Carbohydrate                Evaluation of Received Nutrient/Fluid Intake     Row Name 05/02/23 1053          Intake Assessment    Energy/Calorie Requirement Assessment meeting needs     Protein Requirement Assessment meeting needs        PO Evaluation    Number of Days PO Intake Evaluated 2 days     Number of Meals 4     % PO Intake 81.3                   Problem/Interventions:   Problem 1     Row Name 05/02/23 1056          Nutrition Diagnoses Problem 1    Problem 1 Increased Nutrient Needs     Macronutrient Protein;Kcal     Micronutrient Vitamin;Mineral     Etiology (related to) Medical Diagnosis     Signs/Symptoms (evidenced  by) Report/Observation  L foot DM ulcer                Problem 2     Row Name 05/02/23 1057 05/02/23 1056       Nutrition Diagnoses Problem 2    Problem 2 Altered Nutrition Related to Labs Altered Nutrition Related to Labs    Etiology (related to) Medical Diagnosis Medical Diagnosis    Endocrine DM DM    Signs/Symptoms (evidenced by) Biochemical Biochemical    Specific Labs Noted HgbA1C;Glucose HgbA1C;Glucose                   Intervention Goal     Row Name 05/02/23 1057          Intervention Goal    General Maintain nutrition;Meet nutritional needs for age/condition;Disease management/therapy;Improved nutrition related lab(s);Provide information regarding MNT for treatment/condition     PO Maintain intake;Meet estimated needs;Tolerate PO     Weight Maintain weight                Nutrition Intervention     Row Name 05/02/23 1057          Nutrition Intervention    RD/Tech Action Follow Tx progress;Encourage intake;Supplement provided;Care plan reviewd                Nutrition Prescription     Row Name 05/02/23 1057          Other Orders    Obtain Weight Daily     Obtain Weight Ordered? No, recommended     Supplement Vitamin mineral supplement     Supplement Ordered? No, recommended     Labs K+;Phos;Mg++;Na+     Labs Ordered? No, recommended                Education/Evaluation     Row Name 05/02/23 1059          Education    Education Will Instruct as appropriate        Monitor/Evaluation    Monitor Per protocol;PO intake;Weight;Skin status;Supplement intake;Pertinent labs;Symptoms                 Electronically signed by:  Anh Low RD  05/02/23 10:57 EDT

## 2023-05-02 NOTE — PROGRESS NOTES
[facility] HOSPITALIST    PROGRESS NOTE    Name:  Seb Pettit   Age:  62 y.o.  Sex:  male  :  1960  MRN:  [id number]   Visit Number:  [id number]  Admission Date:  2023  Date Of Service:  23  Primary Care Physician:  Amado Phillips PA     LOS: 3 days :    Chief Complaint:      Foot swelling, redness, drainage    Subjective:    Feeling a lot better today, no concerns.  Ready to go home tomorrow.    Hospital Course:    Patient is a 62-year-old man with past medical history of type 2 diabetes, dyslipidemia, hypothyroidism, GERD, peripheral neuropathy.  Presented to [facility] ED on 2023 with concern for high blood sugar and left foot pain.  Said that about 6 weeks prior he stepped on a piece of glass and has had a foot ulcer since then, has been following with podiatry in [place].  He has had blood sugars in the in the past couple days, left foot has become progressively more red and swelling during that time.  He does follow with podiatry for diabetic foot ulcer of the left foot, recently started taking antibiotics a couple days prior.  Starting Friday morning , did report fever and worsening swelling in his foot prompting him to come to the ED.  Denied shortness of air, chest pain.     ED summary: Afebrile, vital signs stable on room air.  Blood glucose 320, sodium 132.  CRP 13.  Procalcitonin negative.  Leukocytosis 17.  Sed rate 64.  Blood cultures collected.  Wound culture collected.  XR left foot per ED interpretation soft tissue swelling noted to second toe, no evidence of osteomyelitis.  He was provided vancomycin and cefepime.  With no podiatry coverage until , ED did attempt to call other hospitals, no immediate availability and would likely be on prolonged wait list.     Inpatient general floor admission 2023 with cellulitis and diabetic foot ulcer of left foot, failed outpatient treatment.      Review of Systems:     All systems were reviewed and  negative except as mentioned in subjective, assessment and plan.    Vital Signs:    Temp:  [97 °F (36.1 °C)-98.6 °F (37 °C)] 98.1 °F (36.7 °C)  Heart Rate:  [] 69  Resp:  [16-18] 18  BP: (138-153)/(93-99) 146/94    Intake and output:    I/O last 3 completed shifts:  In: 1370 [P.O.:1320; IV Piggyback:50]  Out: 2200 [Urine:2200]  I/O this shift:  In: 480 [P.O.:480]  Out: 400 [Urine:400]    Physical Examination:    General Appearance:  Alert and cooperative.    Head:  Atraumatic and normocephalic.  Poor dentition noted.   Eyes: Conjunctivae and sclerae normal, no icterus. No pallor.   Throat: No oral lesions, no thrush, oral mucosa moist.   Neck: Supple, trachea midline, no thyromegaly.   Lungs:   Breath sounds heard bilaterally equally.  No wheezing or crackles. No Pleural rub or bronchial breathing.   Heart:  Normal S1 and S2, no murmur, no gallop, no rub. No JVD.   Abdomen:   Normal bowel sounds, no masses, no organomegaly. Soft, nontender, nondistended, no rebound tenderness.   Extremities:  No edema, circular ulcer plantar left foot with associated cellulitis and edema, ulcerated lateral portion of second metatarsal   Skin:  Warm.   Neurologic: Alert and oriented x 3. No facial asymmetry. Moves all four limbs. No tremors.      Laboratory results:    Results from last 7 days   Lab Units 05/01/23  0530 04/30/23  0553 04/29/23  0617 04/28/23  1948   SODIUM mmol/L 138 139 130* 132*   POTASSIUM mmol/L 3.7 3.5 3.9 3.6   CHLORIDE mmol/L 102 102 98 96*   CO2 mmol/L 28.3 27.4 23.9 23.0   BUN mg/dL 12 12 12 17   CREATININE mg/dL 0.80 0.84 0.74* 0.84   CALCIUM mg/dL 9.1 8.9 8.4* 8.9   BILIRUBIN mg/dL  --   --   --  0.9   ALK PHOS U/L  --   --   --  130*   ALT (SGPT) U/L  --   --   --  17   AST (SGOT) U/L  --   --   --  16   GLUCOSE mg/dL 264* 251* 285* 320*     Results from last 7 days   Lab Units 05/02/23  0628 05/01/23  0530 04/30/23  0553   WBC 10*3/mm3 10.06 9.82 10.81*   HEMOGLOBIN g/dL 13.7 13.5 14.0   HEMATOCRIT  % 40.9 39.7 41.0   PLATELETS 10*3/mm3 288 298 257             Results from last 7 days   Lab Units 04/28/23 2048 04/28/23 2042 04/28/23 2039   BLOODCX  No growth at 3 days No growth at 3 days  --    WOUNDCX   --   --  Moderate growth (3+) Staphylococcus aureus, MRSA*  Light growth (2+) Normal Skin Lynn     No results for input(s): PHART, DQU3XFU, PO2ART, CQM8LHT, BASEEXCESS in the last 8760 hours.   I have reviewed the patient's laboratory results.    Radiology results:    No radiology results from the last 24 hrs  I have reviewed the patient's radiology reports.    Medication Review:     I have reviewed the patient's active and prn medications.     Problem List:      Cellulitis in diabetic foot    T2DM (type 2 diabetes mellitus)    Hypothyroid    Diabetes mellitus with neuropathy    Dyslipidemia      Assessment/Plan:    Afebrile, vital signs stable on room air.  Not needing daily labs.  Comfortable in bed, no concerns.  Happy for plan to go home next day.  Declined offer to call his wife, said that he will talk to her.    Cellulitis, POA  Diabetic foot ulcer  Vancomycin and Zosyn started 4/29, plan for 14 days p.o. doxycycline to start 5/3.  Blood cultures negative.  Wound culture MRSA.  CT left lower extremity plantar soft tissue ulcer underlying second MTP joint, no CT evidence of osteomyelitis.  Podiatry consultation, recommendations appreciated, recommend 5 days IV antibiotics then 14 days p.o. Betadine daily to toe and foot ulcers.  Patient is at risk for left second toe amputation, not emergent as there are no bone improvement.  Will need follow-up with podiatry.     Chronic:  type 2 diabetes, dyslipidemia, hypothyroidism, GERD, peripheral neuropathy.      Hold home diabetic medications.  Provide subcutaneous insulin protocol.  Continue other home medications.     Risk Assessment: High  DVT Prophylaxis: SCDs  Code Status: Full code  Diet: Heart healthy/carbohydrate controlled  Discharge Plan: Home  5/3    Brian Joseph Kerley, DO  05/02/23  12:35 EDT    Dictated utilizing Dragon dictation.

## 2023-05-02 NOTE — PLAN OF CARE
Goal Outcome Evaluation:  Plan of Care Reviewed With: patient        Progress: no change  Outcome Evaluation: PT RESTING COMFORTABLY ON RA AT THIS TIME. NO ACUTE EVENTS OVERNIGHT. VSS. WILL CONTINUE TO MONITOR.

## 2023-05-02 NOTE — PLAN OF CARE
Goal Outcome Evaluation:  Plan of Care Reviewed With: patient        Progress: no change  Outcome Evaluation: FSBG monitored throughout shift; coverage given as needed. IV anbx administered. No acute events occurred during shift. Will continue to monitor.

## 2023-05-03 ENCOUNTER — HOME HEALTH ADMISSION (OUTPATIENT)
Dept: HOME HEALTH SERVICES | Facility: HOME HEALTHCARE | Age: 63
End: 2023-05-03
Payer: COMMERCIAL

## 2023-05-03 ENCOUNTER — READMISSION MANAGEMENT (OUTPATIENT)
Dept: CALL CENTER | Facility: HOSPITAL | Age: 63
End: 2023-05-03
Payer: COMMERCIAL

## 2023-05-03 VITALS
HEART RATE: 72 BPM | OXYGEN SATURATION: 96 % | SYSTOLIC BLOOD PRESSURE: 136 MMHG | DIASTOLIC BLOOD PRESSURE: 86 MMHG | RESPIRATION RATE: 18 BRPM | BODY MASS INDEX: 27.11 KG/M2 | TEMPERATURE: 98.1 F | WEIGHT: 211.2 LBS | HEIGHT: 74 IN

## 2023-05-03 LAB
BACTERIA SPEC AEROBE CULT: NORMAL
BACTERIA SPEC AEROBE CULT: NORMAL
GLUCOSE BLDC GLUCOMTR-MCNC: 193 MG/DL (ref 70–130)

## 2023-05-03 PROCEDURE — 99239 HOSP IP/OBS DSCHRG MGMT >30: CPT | Performed by: STUDENT IN AN ORGANIZED HEALTH CARE EDUCATION/TRAINING PROGRAM

## 2023-05-03 PROCEDURE — 82948 REAGENT STRIP/BLOOD GLUCOSE: CPT

## 2023-05-03 PROCEDURE — 63710000001 INSULIN ASPART PER 5 UNITS: Performed by: INTERNAL MEDICINE

## 2023-05-03 PROCEDURE — 63710000001 INSULIN ASPART PER 5 UNITS: Performed by: STUDENT IN AN ORGANIZED HEALTH CARE EDUCATION/TRAINING PROGRAM

## 2023-05-03 RX ORDER — LISINOPRIL 10 MG/1
10 TABLET ORAL
Status: DISCONTINUED | OUTPATIENT
Start: 2023-05-03 | End: 2023-05-03 | Stop reason: HOSPADM

## 2023-05-03 RX ORDER — DOXYCYCLINE HYCLATE 100 MG/1
100 CAPSULE ORAL 2 TIMES DAILY
Qty: 28 CAPSULE | Refills: 0 | Status: SHIPPED | OUTPATIENT
Start: 2023-05-03

## 2023-05-03 RX ADMIN — LEVOTHYROXINE SODIUM 88 MCG: 88 TABLET ORAL at 05:05

## 2023-05-03 RX ADMIN — Medication 10 ML: at 08:07

## 2023-05-03 RX ADMIN — ATORVASTATIN CALCIUM 40 MG: 40 TABLET, FILM COATED ORAL at 08:07

## 2023-05-03 RX ADMIN — LISINOPRIL 10 MG: 10 TABLET ORAL at 02:45

## 2023-05-03 RX ADMIN — PANTOPRAZOLE SODIUM 40 MG: 40 TABLET, DELAYED RELEASE ORAL at 08:07

## 2023-05-03 RX ADMIN — DOXYCYCLINE 100 MG: 100 CAPSULE ORAL at 08:07

## 2023-05-03 RX ADMIN — INSULIN ASPART 2 UNITS: 100 INJECTION, SOLUTION INTRAVENOUS; SUBCUTANEOUS at 06:50

## 2023-05-03 RX ADMIN — INSULIN ASPART 5 UNITS: 100 INJECTION, SOLUTION INTRAVENOUS; SUBCUTANEOUS at 08:06

## 2023-05-03 NOTE — CASE MANAGEMENT/SOCIAL WORK
Continued Stay Note   [place]     Patient Name: Seb Pettit  MRN: [id number]  Today's Date: 5/3/2023    Admit Date: 4/28/2023    Plan: DCP   Discharge Plan     Row Name 05/03/23 0912       Plan    Plan Comments Pt has been accepted by [facility] for their services .               Discharge Codes    No documentation.               Expected Discharge Date and Time     Expected Discharge Date Expected Discharge Time    May 3, 2023             Consuelo Hernandez RN

## 2023-05-03 NOTE — PLAN OF CARE
Goal Outcome Evaluation:  Plan of Care Reviewed With: patient        Progress: no change  Outcome Evaluation: PT LYING IN BED RESTING COMFORTABLY ON RA. WOUND CARE AND DRESSING CHANGE DONE. BP REMAINED ELEVATED THIS SHIFT. HOSPITALIST CONTACTED AND SCHEDULED LISINOPRIL ORDERED. WILL CONTINUE TO MONITOR V/S.

## 2023-05-03 NOTE — DISCHARGE SUMMARY
[facility] HOSPITALIST   DISCHARGE SUMMARY      Name:  Seb Pettit   Age:  62 y.o.  Sex:  male  :  1960  MRN:  [id number]   Visit Number:  [id number]    Admission Date:  2023  Date of Discharge:  5/3/2023  Primary Care Physician:  Amado Phillips PA    Important issues to note:    -Continue 14 days p.o. doxycycline.  Completed 5 days of vancomycin and Zosyn for MRSA positive wound culture of diabetic foot infection left foot.  No osteomyelitis on CT.  Close follow-up with his podiatrist.  -Providing home health wound care.  Podiatry recommended Betadine daily to toes and wounds.    Discharge Diagnoses:     Diabetic foot infection    Problem List:     Active Hospital Problems    Diagnosis  POA   • **Cellulitis in diabetic foot [E11.628, L03.119]  Yes   • T2DM (type 2 diabetes mellitus) [E11.9]  Yes   • Hypothyroid [E03.9]  Yes   • Diabetes mellitus with neuropathy [E11.40]  Yes   • Dyslipidemia [E78.5]  Yes      Resolved Hospital Problems   No resolved problems to display.     Presenting Problem:    Chief Complaint   Patient presents with   • Foot Pain   • Hyperglycemia      Consults:     Consulting Physician(s)     Provider Relationship Specialty    Mari Sorensen DPM Consulting Physician Podiatry        Procedures Performed:    none    History of presenting illness/Hospital Course:    Patient is a 62-year-old man with past medical history of type 2 diabetes, dyslipidemia, hypothyroidism, GERD, peripheral neuropathy.  Presented to [facility] ED on 2023 with concern for high blood sugar and left foot pain.  Said that about 6 weeks prior he stepped on a piece of glass and has had a foot ulcer since then, has been following with podiatry in [place].  He has had blood sugars in the in the past couple days, left foot has become progressively more red and swelling during that time.  He does follow with podiatry for diabetic foot ulcer of the left foot, recently started taking  antibiotics a couple days prior.  Starting Friday morning 4/28, did report fever and worsening swelling in his foot prompting him to come to the ED.  Denied shortness of air, chest pain.     ED summary: Afebrile, vital signs stable on room air.  Blood glucose 320, sodium 132.  CRP 13.  Procalcitonin negative.  Leukocytosis 17.  Sed rate 64.  Blood cultures collected.  Wound culture collected.  XR left foot per ED interpretation soft tissue swelling noted to second toe, no evidence of osteomyelitis.  He was provided vancomycin and cefepime.  With no podiatry coverage until 5/1, ED did attempt to call other hospitals, no immediate availability and would likely be on prolonged wait list.     Inpatient general floor admission 4/29/2023 with cellulitis and diabetic foot ulcer of left foot, failed outpatient treatment.    Stable for discharge home with home health on 5/3/2023.    -Continue 14 days p.o. doxycycline.  Completed 5 days of vancomycin and Zosyn for MRSA positive wound culture of diabetic foot infection left foot.  No osteomyelitis on CT.  Close follow-up with his podiatrist.  -Providing home health wound care.    Vital Signs:    Temp:  [98 °F (36.7 °C)-98.5 °F (36.9 °C)] 98.1 °F (36.7 °C)  Heart Rate:  [72-86] 72  Resp:  [16-18] 18  BP: (136-161)/() 136/86    Physical Exam:    General Appearance:  Alert and cooperative.    Head:  Atraumatic and normocephalic.   Eyes: Conjunctivae and sclerae normal, no icterus. No pallor.   Ears:  Ears with no abnormalities noted.   Throat: No oral lesions, no thrush, oral mucosa moist.   Neck: Supple, trachea midline, no thyromegaly.   Back:   No kyphoscoliosis present. No tenderness to palpation.   Lungs:   Breath sounds heard bilaterally equally.  No crackles or wheezing. No Pleural rub or bronchial breathing.   Heart:  Normal S1 and S2, no murmur, no gallop, no rub. No JVD.   Abdomen:   Normal bowel sounds, no masses, no organomegaly. Soft, nontender, nondistended,  no rebound tenderness.   Extremities: No edema, circular ulcer plantar left foot with associated cellulitis and edema, ulcerated lateral portion of second metatarsal   Pulses: Pulses palpable bilaterally.   Skin:  Warm   Neurologic: Alert and oriented x 3. No facial asymmetry. Moves all four limbs. No tremors.     Pertinent Lab Results:     Results from last 7 days   Lab Units 05/02/23  1219 05/01/23  0530 04/30/23  0553 04/29/23  0617 04/28/23  1948   SODIUM mmol/L 137 138 139   < > 132*   POTASSIUM mmol/L 4.0 3.7 3.5   < > 3.6   CHLORIDE mmol/L 100 102 102   < > 96*   CO2 mmol/L 27.6 28.3 27.4   < > 23.0   BUN mg/dL 8 12 12   < > 17   CREATININE mg/dL 0.73* 0.80 0.84   < > 0.84   CALCIUM mg/dL 8.9 9.1 8.9   < > 8.9   BILIRUBIN mg/dL  --   --   --   --  0.9   ALK PHOS U/L  --   --   --   --  130*   ALT (SGPT) U/L  --   --   --   --  17   AST (SGOT) U/L  --   --   --   --  16   GLUCOSE mg/dL 142* 264* 251*   < > 320*    < > = values in this interval not displayed.     Results from last 7 days   Lab Units 05/02/23  1219 05/02/23  0628 05/01/23  0530   WBC 10*3/mm3 9.64 10.06 9.82   HEMOGLOBIN g/dL 14.3 13.7 13.5   HEMATOCRIT % 41.8 40.9 39.7   PLATELETS 10*3/mm3 313 288 298                             Results from last 7 days   Lab Units 04/28/23 2048 04/28/23 2042 04/28/23 2039   BLOODCX  No growth at 4 days No growth at 4 days  --    WOUNDCX   --   --  Moderate growth (3+) Staphylococcus aureus, MRSA*  Light growth (2+) Normal Skin Lynn       Pertinent Radiology Results:    Imaging Results (All)     Procedure Component Value Units Date/Time    CT Lower Extremity Left Without Contrast [[id number]] Collected: 04/29/23 1337     Updated: 04/29/23 1346    Narrative:      CT SCAN OF THE RIGHT FOOT WITHOUT CONTRAST 4/29/2023 1:19 PM      HISTORY: Foot swelling, osteomyelitis suspected      COMPARISON: None.     PROCEDURE: Axial images obtained through the right foot. Coronal and  sagittal reformatted images were  obtained. This study was performed with  techniques to keep radiation doses as low as reasonably achievable,  (ALARA). Individualized dose reduction techniques using automated  exposure control or adjustment of mA and/or kV according to the patient  size were employed.     FINDINGS: Small soft tissue ulcer noted underlying the second  metatarsophalangeal joint. There is no osteolysis/bone destruction or  periosteal reaction to suggest osteomyelitis. No fracture or dislocation  seen. Calcaneal spurs/enthesophytes noted with chronic thickening of the  plantar fascia. Arterial calcifications are noted. Diffuse soft tissue  swelling. No definite fluid collection.       Impression:      Plantar soft tissue ulcer underlying the second MTP joint. No CT  evidence of osteomyelitis. Would recommend MRI however.     This report was signed and finalized on 4/29/2023 1:44 PM by Dr Ja Powell DO.    XR Foot 3+ View Left [[id number]] Collected: 04/29/23 0655     Updated: 04/29/23 0659    Narrative:      LEFT FOOT     HISTORY: Left foot pain and redness.      FINDINGS:  A three view exam demonstrates no acute fracture or  dislocation. The joint spaces appear normal. Prominent plantar calcaneal  spur. Arterial calcifications. No bone destruction.       Impression:      No acute fracture.               This report was signed and finalized on 4/29/2023 6:57 AM by Dr Ja Powell DO.          Echo:      Condition on Discharge:      Stable.    Code status during the hospital stay:    Code Status and Medical Interventions:   Ordered at: 04/29/23 0127     Code Status (Patient has no pulse and is not breathing):    CPR (Attempt to Resuscitate)     Medical Interventions (Patient has pulse or is breathing):    Full Support     Discharge Disposition:    Home-Health Care [facility]    Discharge Medications:       Discharge Medications      New Medications      Instructions Start Date   doxycycline 100 MG capsule  Commonly known as: VIBRAMYCIN   100  mg, Oral, 2 Times Daily         Changes to Medications      Instructions Start Date   levothyroxine 88 MCG tablet  Commonly known as: SYNTHROID, LEVOTHROID  What changed: Another medication with the same name was removed. Continue taking this medication, and follow the directions you see here.   88 mcg, Oral, Daily         Continue These Medications      Instructions Start Date   acetaminophen 650 MG 8 hr tablet  Commonly known as: Tylenol 8 Hour   650 mg, Oral, Every 8 Hours PRN      atorvastatin 40 MG tablet  Commonly known as: LIPITOR   Take 1 tablet by mouth Every Night.      glimepiride 1 MG tablet  Commonly known as: AMARYL   4 mg, Oral, Every Morning Before Breakfast      ibuprofen 600 MG tablet  Commonly known as: ADVIL,MOTRIN   600 mg, Oral, Every 8 Hours PRN      metFORMIN 1000 MG tablet  Commonly known as: GLUCOPHAGE   No dose, route, or frequency recorded.      omeprazole 20 MG capsule  Commonly known as: priLOSEC   20 mg, Oral, 2 Times Daily         Stop These Medications    cyclobenzaprine 5 MG tablet  Commonly known as: FLEXERIL     Dulcolax 5 MG EC tablet  Generic drug: bisacodyl     GNP Vitamin D Super Strength 125 MCG (5000 UT) tablet  Generic drug: Cholecalciferol     meloxicam 15 MG tablet  Commonly known as: MOBIC     ondansetron ODT 4 MG disintegrating tablet  Commonly known as: ZOFRAN-ODT          Discharge Diet:     Diet Instructions     Diet: Cardiac Diets, Diabetic Diets; Healthy Heart (2-3 Na+); Regular Texture (IDDSI 7); Thin (IDDSI 0); Consistent Carbohydrate      Discharge Diet:  Cardiac Diets  Diabetic Diets       Cardiac Diet: Healthy Heart (2-3 Na+)    Texture: Regular Texture (IDDSI 7)    Fluid Consistency: Thin (IDDSI 0)    Diabetic Diet: Consistent Carbohydrate        Activity at Discharge:     Activity Instructions     Activity as Tolerated          Follow-up Appointments:    Additional Instructions for the Follow-ups that You Need to Schedule     Ambulatory Referral to Home  [facility]   As directed      Face to Face Visit Date: 5/3/2023    Follow-up provider for Plan of Care?: I treated the patient in an acute care facility and will not continue treatment after discharge.    Follow-up provider: MOISÉS DUTTON [[id number]]    Reason/Clinical Findings: Diabetic foot wound    Describe mobility limitations that make leaving home difficult: Diabetic foot wound    Nursing/Therapeutic Services Requested: Skilled Nursing    Skilled nursing orders: Wound care dressing/changes    Frequency: 1 Week 1            Follow-up Information     Moisés Dutton PA Follow up in 3 day(s).    Specialty: Family Medicine  Why: Please schedule prior to DC  Contact information:  [address]  [place] [address]  [phone]             Liu Uriostegui DPM Follow up in 1 week(s).    Specialty: Podiatry  Why: May 10th at 10:30 am  Contact information:  [address] [place] [address] [phone]                       No future appointments.  Test Results Pending at Discharge:    Pending Labs     Order Current Status    Blood Culture With JESSICA - Blood, Arm, Left Preliminary result    Blood Culture With JESSICA - Blood, Hand, Left Preliminary result             Brian Joseph Kerley, DO  05/03/23  08:54 EDT    Time: I spent 35 minutes on this discharge activity which included: face-to-face encounter with the patient, reviewing the data in the system, coordination of the care with the nursing staff as well as consultants, documentation, and entering orders.     Dictated utilizing Dragon dictation.

## 2023-05-03 NOTE — CASE MANAGEMENT/SOCIAL WORK
Case Management Discharge Note                Selected Continued Care - Admitted Since 4/28/2023     Destination    No services have been selected for the patient.              Durable Medical Equipment    No services have been selected for the patient.              Dialysis/Infusion    No services have been selected for the patient.              Home Medical Care     Service Provider Selected Services Address Phone Fax Patient Preferred     [facility] [address] [address] [phone] --          Therapy    No services have been selected for the patient.              Community Resources    No services have been selected for the patient.              Community & DME    No services have been selected for the patient.                  Transportation Services  Private: Car    Final Discharge Disposition Code: 06 - home with home health care

## 2023-05-04 ENCOUNTER — HOME CARE VISIT (OUTPATIENT)
Dept: HOME HEALTH SERVICES | Facility: HOME HEALTHCARE | Age: 63
End: 2023-05-04

## 2023-05-04 NOTE — OUTREACH NOTE
Prep Survey    Flowsheet Row Responses   [facility]? [facility]   Is LACE score < 7 ? No   Eligibility Readm Mgmt   Discharge diagnosis Cellulitis in diabetic foot   Does the patient have one of the following disease processes/diagnoses(primary or secondary)? Other   Does the patient have Home health ordered? Yes   What is the Home health agency?  [facility]    Is there a DME ordered? No   Prep survey completed? Yes          Marisa CONTRERAS - Registered Nurse

## 2023-05-05 ENCOUNTER — READMISSION MANAGEMENT (OUTPATIENT)
Dept: CALL CENTER | Facility: HOSPITAL | Age: 63
End: 2023-05-05
Payer: COMMERCIAL

## 2023-05-05 NOTE — OUTREACH NOTE
Halle Koo (:  1971) is a 52 y.o. male,Established patient, here for evaluation of the following chief complaint(s):  Medication Refill         ASSESSMENT/PLAN:  1. Anxiety  2. Gastroesophageal reflux disease without esophagitis      Refill prozac 20 mg po. Anxiety stable, no changes  Exercise  Continue nexium for gerd, stable  Return in about 1 year (around 2023). Subjective   SUBJECTIVE/OBJECTIVE:  HPI    Anxiety. Doing good on prozac. Did miss last couple weeks since ran out. Notices getting worse again. Denies depression. No concerns with it. Iris Castaneda doing good on nexium. Watching diet. No problems. Review of Systems   Constitutional:  Negative for activity change, appetite change, chills, diaphoresis, fatigue, fever and unexpected weight change. HENT:  Negative for congestion, ear pain, postnasal drip, sinus pressure and sore throat. Eyes:  Negative for visual disturbance. Respiratory:  Negative for cough, chest tightness, shortness of breath, wheezing and stridor. Cardiovascular:  Negative for chest pain, palpitations and leg swelling. Gastrointestinal:  Negative for abdominal distention, abdominal pain, constipation, diarrhea, nausea and vomiting. Endocrine: Negative for polydipsia, polyphagia and polyuria. Genitourinary:  Negative for decreased urine volume, difficulty urinating, dysuria, flank pain, frequency, hematuria and urgency. Musculoskeletal:  Negative for arthralgias, back pain, gait problem, joint swelling, myalgias and neck pain. Skin:  Negative for color change, pallor and rash. Neurological:  Negative for dizziness, syncope, weakness, light-headedness, numbness and headaches. Hematological:  Negative for adenopathy. Psychiatric/Behavioral:  Negative for behavioral problems, self-injury and sleep disturbance. The patient is not nervous/anxious. Objective   Physical Exam  Vitals reviewed.    Constitutional: Medical Week 1 Survey    Flowsheet Row Responses   [facility] facility [facility] from? [facility]   Does the patient have one of the following disease processes/diagnoses(primary or secondary)? Other   Week 1 attempt successful? No   Unsuccessful attempts Attempt 1  [uTR both numbers, 2nd number unreachable]          Aracelis HOLLINS - Registered Nurse

## 2023-05-08 ENCOUNTER — READMISSION MANAGEMENT (OUTPATIENT)
Dept: CALL CENTER | Facility: HOSPITAL | Age: 63
End: 2023-05-08
Payer: COMMERCIAL

## 2023-05-08 NOTE — OUTREACH NOTE
Medical Week 1 Survey    Flowsheet Row Responses   [facility] patient discharged from? [facility]   Does the patient have one of the following disease processes/diagnoses(primary or secondary)? Other   Week 1 attempt successful? Yes   Call start time 1724   Call end time 1738   Discharge diagnosis Cellulitis in diabetic foot   Person spoke with today (if not patient) and relationship BubbajuanTawny Spouse    Meds reviewed with patient/caregiver? Yes   Does the patient have all medications ordered at discharge? Yes   Is the patient taking all medications as directed (includes completed medication regime)? Yes   Does the patient have a primary care provider?  Yes   Does the patient have an appointment with their PCP within 7 days of discharge? Yes   Comments regarding PCP SHAHAB Lmabert PCP. Saw PCP today    Has the patient kept scheduled appointments due by today? Yes   Comments Podiatry appt 5/10  1030am   What is the Home health agency?  [facility]    Psychosocial issues? No   Did the patient receive a copy of their discharge instructions? Yes   Nursing interventions Reviewed instructions with patient   What is the patient's perception of their health status since discharge? Same   Is the patient/caregiver able to teach back signs and symptoms related to disease process for when to call PCP? Yes   Is the patient/caregiver able to teach back the hierarchy of who to call/visit for symptoms/problems? PCP, Specialist, Home health nurse, Urgent Care, ED, 911 Yes   If the patient is a current smoker, are they able to teach back resources for cessation? Not a smoker   Week 1 call completed? Yes   Wrap up additional comments Wife reports that toe remains black. Seeing podiatrist on Wednesday           OSWALDO GIRALDO - Registered Nurse

## 2023-05-17 ENCOUNTER — READMISSION MANAGEMENT (OUTPATIENT)
Dept: CALL CENTER | Facility: HOSPITAL | Age: 63
End: 2023-05-17
Payer: COMMERCIAL

## 2023-05-17 NOTE — OUTREACH NOTE
Medical Week 2 Survey    Flowsheet Row Responses   [facility] patient discharged from? [facility]   Does the patient have one of the following disease processes/diagnoses(primary or secondary)? Other   Week 2 attempt successful? Yes   Call start time 1521   Discharge diagnosis Cellulitis in diabetic foot   Call end time 1522   Person spoke with today (if not patient) and relationship Tawny Pettit Spouse    Meds reviewed with patient/caregiver? Yes   Is the patient having any side effects they believe may be caused by any medication additions or changes? No   Does the patient have all medications ordered at discharge? Yes   Is the patient taking all medications as directed (includes completed medication regime)? Yes   Does the patient have a primary care provider?  Yes   Does the patient have an appointment with their PCP within 7 days of discharge? Yes   Has the patient kept scheduled appointments due by today? Yes   What is the Home health agency?  [facility]    Has home health visited the patient within 72 hours of discharge? N/A   Psychosocial issues? No   Did the patient receive a copy of their discharge instructions? Yes   Nursing interventions Reviewed instructions with patient   What is the patient's perception of their health status since discharge? Same   Is the patient/caregiver able to teach back signs and symptoms related to disease process for when to call PCP? Yes   Is the patient/caregiver able to teach back signs and symptoms related to disease process for when to call 911? Yes   Is the patient/caregiver able to teach back the hierarchy of who to call/visit for symptoms/problems? PCP, Specialist, Home health nurse, Urgent Care, ED, 911 Yes   Week 2 Call Completed? Yes   Wrap up additional comments Toe is black and Pt is seeing a surgeon tomorrow          MONI VANEGAS - Registered Nurse

## 2023-05-26 ENCOUNTER — TELEPHONE (OUTPATIENT)
Dept: SURGERY | Facility: CLINIC | Age: 63
End: 2023-05-26
Payer: COMMERCIAL

## 2023-05-26 NOTE — TELEPHONE ENCOUNTER
Patient on 2 year recall for colonoscopy. Called patient home  Wife answered and I scheduled patiet to see Dr Ojeda 0607/23.

## 2023-06-02 ENCOUNTER — READMISSION MANAGEMENT (OUTPATIENT)
Dept: CALL CENTER | Facility: HOSPITAL | Age: 63
End: 2023-06-02

## 2023-06-02 NOTE — OUTREACH NOTE
Medical Week 3 Survey    Flowsheet Row Responses   [facility] patient discharged from? [facility]   Does the patient have one of the following disease processes/diagnoses(primary or secondary)? Other   Week 3 attempt successful? Yes   Call start time 1357   Call end time 1400   Discharge diagnosis Cellulitis in diabetic foot   Meds reviewed with patient/caregiver? Yes   Is the patient having any side effects they believe may be caused by any medication additions or changes? No   Does the patient have all medications ordered at discharge? Yes   Is the patient taking all medications as directed (includes completed medication regime)? Yes   Does the patient have a primary care provider?  Yes   Does the patient have an appointment with their PCP within 7 days of discharge? Greater than 7 days   What is preventing the patient from scheduling follow up appointments within 7 days of discharge? Earlier appointment not available   Nursing Interventions Verified appointment date/time/provider   Has the patient kept scheduled appointments due by today? Yes   Has home health visited the patient within 72 hours of discharge? N/A   Psychosocial issues? No   Did the patient receive a copy of their discharge instructions? Yes   Nursing interventions Reviewed instructions with patient   What is the patient's perception of their health status since discharge? Improving   Is the patient/caregiver able to teach back signs and symptoms related to disease process for when to call PCP? Yes   Is the patient/caregiver able to teach back signs and symptoms related to disease process for when to call 911? Yes   Is the patient/caregiver able to teach back the hierarchy of who to call/visit for symptoms/problems? PCP, Specialist, Home health nurse, Urgent Care, ED, 911 Yes   Additional teach back comments states foot healing well   Week 3 Call Completed? Yes   Graduated Yes   Is the patient interested in additional calls from an ambulatory  ?  NOTE:  applies to high risk patients requiring additional follow-up. No   Graduated/Revoked comments pt stable, no more calls needed          Aracelis HOLLINS - Registered Nurse

## 2023-06-12 NOTE — PROGRESS NOTES
Patient: Seb Pettit    YOB: 1960    Date: 06/14/2023    Primary Care Provider: Amado Phillips PA    Chief Complaint   Patient presents with    History of hepatic flexure polyps    Consult     2 year recall colonoscopy eval    Colonoscopy evaluation       SUBJECTIVE:    History of present illness:  Patient has a history significant for DM and he has a history of hepatic flexure polyps. I saw the patient in the office today as a consultation for a colonoscopy.  Patient's last colonoscopy was performed 05/04/2021 at which time he did have a serrated polyp consistent with sessile serrated adenoma removed from his hepatic flexure. Patient denies any abdominal pain. He states he is having no issues with nausea, vomiting or diarrhea, but states he is experiencing constipation. Patient denies family history of colon cancer.     He does have a history significant for diabetes and did have a second toe amputation recently.  He did have previous colonoscopy with serrated adenoma removal from the hepatic flexure in May 2021.    The following portions of the patient's history were reviewed and updated as appropriate: allergies, current medications, past family history, past medical history, past social history, past surgical history and problem list.    Review of Systems   Constitutional:  Negative for chills, fever and unexpected weight change.   HENT:  Negative for trouble swallowing and voice change.    Eyes:  Negative for visual disturbance.   Respiratory:  Negative for apnea, cough, chest tightness, shortness of breath and wheezing.    Cardiovascular:  Negative for chest pain, palpitations and leg swelling.   Gastrointestinal:  Positive for constipation. Negative for abdominal distention, abdominal pain, anal bleeding, blood in stool, diarrhea, nausea, rectal pain and vomiting.   Endocrine: Negative for cold intolerance and heat intolerance.   Genitourinary:  Negative for difficulty urinating,  "dysuria, flank pain, scrotal swelling and testicular pain.   Musculoskeletal:  Negative for back pain, gait problem and joint swelling.   Skin:  Negative for color change, rash and wound.   Neurological:  Negative for dizziness, syncope, speech difficulty, weakness, numbness and headaches.   Hematological:  Negative for adenopathy. Does not bruise/bleed easily.   Psychiatric/Behavioral:  Negative for confusion. The patient is not nervous/anxious.      History:  Past Medical History:   Diagnosis Date    Diabetes mellitus        Past Surgical History:   Procedure Laterality Date    COLONOSCOPY      ENDOSCOPY      TOE OSTEOPHYTE REMOVAL Left 2023    Left 2nd toe       History reviewed. No pertinent family history.    Social History     Tobacco Use    Smoking status: Never    Smokeless tobacco: Never   Vaping Use    Vaping Use: Never used   Substance Use Topics    Alcohol use: Never    Drug use: Never       Allergies:  No Known Allergies    Medications:    Current Outpatient Medications:     Lancets (OneTouch Delica Plus [id number]) misc, , Disp: , Rfl:     omeprazole (priLOSEC) 20 MG capsule, Take 1 capsule by mouth 2 (Two) Times a Day., Disp: , Rfl:     OneTouch Ultra test strip, , Disp: , Rfl:     Ozempic, 0.25 or 0.5 MG/DOSE, 2 MG/3ML solution pen-injector, INJECT 0.5MG BELOW THE SKIN AS DIRECTED, Disp: , Rfl:     atorvastatin (LIPITOR) 40 MG tablet, Take 1 tablet by mouth Every Night. (Patient not taking: Reported on 6/14/2023), Disp: , Rfl:     levothyroxine (SYNTHROID, LEVOTHROID) 88 MCG tablet, Take 1 tablet by mouth Daily. (Patient not taking: Reported on 6/14/2023), Disp: , Rfl:     OBJECTIVE:    Vital Signs:   Vitals:    06/14/23 1257   BP: 130/76   BP Location: Left arm   Patient Position: Sitting   Cuff Size: Adult   Pulse: 112   Temp: 97.6 °F (36.4 °C)   TempSrc: Temporal   SpO2: 97%   Weight: 90.2 kg (198 lb 12.8 oz)   Height: 188 cm (74\")       Physical Exam:   General Appearance:    Alert, cooperative, " in no acute distress   Head:    Normocephalic, without obvious abnormality, atraumatic   Eyes:            Lids and lashes normal, conjunctivae and sclerae normal, no   icterus, no pallor, corneas clear, PERRL   Ears:    Ears appear intact with no abnormalities noted   Throat:   No oral lesions, no thrush, oral mucosa moist   Neck:   No adenopathy, supple, trachea midline, no thyromegaly,  no JVD   Lungs:     Clear to auscultation,respirations regular, even and                  unlabored    Heart:    Regular rhythm and normal rate, normal S1 and S2, no            murmur   Abdomen:     no masses, no organomegaly, soft non-tender, non-distended, no guarding, there is no evidence of tenderness, no peritoneal signs   Extremities:   Moves all extremities well, no edema, no cyanosis, no             redness   Pulses:   Pulses palpable and equal bilaterally   Skin:   No bleeding, bruising or rash   Lymph nodes:   No palpable adenopathy   Neurologic:   Cranial nerves 2 - 12 grossly intact, sensation intact      Results Review:   I reviewed the patient's new clinical results.  I reviewed the patient's new imaging results and agree with the interpretation.  I reviewed the patient's other test results and agree with the interpretation    Review of Systems was reviewed and confirmed as accurate as documented by the MA.    ASSESSMENT/PLAN:    1. Adenomatous polyp of transverse colon        I recommend a colonoscopy for further evaluation. The procedure was explained as well as the risks which include but are not limited to bleeding, infection, perforation, abdominal pain etc. The patient understands these risks and the procedure and wishes to proceed.     Electronically signed by Luis A Ojeda MD  06/14/23 15:02 EDT

## 2023-06-14 ENCOUNTER — OFFICE VISIT (OUTPATIENT)
Dept: SURGERY | Facility: CLINIC | Age: 63
End: 2023-06-14
Payer: COMMERCIAL

## 2023-06-14 VITALS
BODY MASS INDEX: 25.51 KG/M2 | SYSTOLIC BLOOD PRESSURE: 130 MMHG | WEIGHT: 198.8 LBS | HEART RATE: 112 BPM | TEMPERATURE: 97.6 F | OXYGEN SATURATION: 97 % | DIASTOLIC BLOOD PRESSURE: 76 MMHG | HEIGHT: 74 IN

## 2023-06-14 DIAGNOSIS — D12.3 ADENOMATOUS POLYP OF TRANSVERSE COLON: Primary | ICD-10-CM

## 2023-06-14 PROCEDURE — 1160F RVW MEDS BY RX/DR IN RCRD: CPT | Performed by: SURGERY

## 2023-06-14 PROCEDURE — 99243 OFF/OP CNSLTJ NEW/EST LOW 30: CPT | Performed by: SURGERY

## 2023-06-14 PROCEDURE — 1159F MED LIST DOCD IN RCRD: CPT | Performed by: SURGERY

## 2023-06-14 RX ORDER — LANCETS 33 GAUGE
EACH MISCELLANEOUS
COMMUNITY
Start: 2023-05-08

## 2023-06-14 RX ORDER — BLOOD SUGAR DIAGNOSTIC
STRIP MISCELLANEOUS
COMMUNITY
Start: 2023-05-08

## 2023-06-14 RX ORDER — POLYETHYLENE GLYCOL 3350 17 G/17G
238 POWDER, FOR SOLUTION ORAL ONCE
Qty: 238 G | Refills: 0 | Status: SHIPPED | OUTPATIENT
Start: 2023-06-14 | End: 2023-06-14

## 2023-06-14 RX ORDER — BISACODYL 5 MG/1
TABLET, DELAYED RELEASE ORAL
Qty: 4 TABLET | Refills: 0 | Status: SHIPPED | OUTPATIENT
Start: 2023-06-14

## 2023-06-14 RX ORDER — SEMAGLUTIDE 0.68 MG/ML
INJECTION, SOLUTION SUBCUTANEOUS
COMMUNITY
Start: 2023-05-30

## 2024-06-10 ENCOUNTER — HOSPITAL ENCOUNTER (EMERGENCY)
Facility: HOSPITAL | Age: 64
Discharge: HOME | End: 2024-06-10
Attending: EMERGENCY MEDICINE | Admitting: EMERGENCY MEDICINE
Payer: COMMERCIAL

## 2024-06-10 VITALS
BODY MASS INDEX: 25.47 KG/M2 | OXYGEN SATURATION: 94 % | DIASTOLIC BLOOD PRESSURE: 74 MMHG | RESPIRATION RATE: 20 BRPM | WEIGHT: 188 LBS | HEART RATE: 107 BPM | HEIGHT: 72 IN | TEMPERATURE: 98 F | SYSTOLIC BLOOD PRESSURE: 107 MMHG

## 2024-06-10 DIAGNOSIS — Z13.9 ENCOUNTER FOR MEDICAL SCREENING EXAMINATION: Primary | ICD-10-CM

## 2024-06-10 DIAGNOSIS — Z00.00 WELL ADULT HEALTH CHECK: ICD-10-CM

## 2024-06-10 PROCEDURE — 99282 EMERGENCY DEPT VISIT SF MDM: CPT

## 2024-06-10 RX ORDER — METOPROLOL TARTRATE 50 MG/1
50 TABLET, FILM COATED ORAL
COMMUNITY
Start: 2024-05-29 | End: 2024-06-28

## 2024-06-10 RX ORDER — ASPIRIN 81 MG/1
81 TABLET ORAL DAILY
COMMUNITY
Start: 2024-05-29 | End: 2024-06-28

## 2024-06-10 RX ORDER — LISINOPRIL 10 MG/1
10 TABLET ORAL DAILY
COMMUNITY
Start: 2024-05-29 | End: 2024-06-28

## 2024-06-10 RX ORDER — CITALOPRAM 20 MG/1
20 TABLET ORAL DAILY
COMMUNITY

## 2024-06-10 RX ORDER — SODIUM HYPOCHLORITE 1.25 MG/ML
SOLUTION TOPICAL
COMMUNITY

## 2024-06-11 NOTE — ED PROVIDER NOTES
"Subjective  History of Present Illness:    Chief Complaint: Concern for fever  History of Present Illness: 64-year-old male presenting with concern for fever, he had a PICC line placed several weeks ago for osteomyelitis of his foot, he is on 6 weeks of antibiotics, his wife states that he felt \"hot today\" so she brought him in because she was informed if he had a fever he needs to be evaluated, she did not check his temperature at home  Onset: Sudden  Duration: Earlier in the day  Exacerbating / Alleviating factors: Recent PICC line placement and antibiotics for osteomyelitis  Associated symptoms: No other reported symptoms      Nurses Notes reviewed and agree, including vitals, allergies, social history and prior medical history.     REVIEW OF SYSTEMS: All systems reviewed and not pertinent unless noted.    Review of Systems   Constitutional:         Medical screening   All other systems reviewed and are negative.      Past Medical History:   Diagnosis Date    Diabetes mellitus        Allergies:    Patient has no known allergies.      Past Surgical History:   Procedure Laterality Date    COLONOSCOPY      ENDOSCOPY      TOE OSTEOPHYTE REMOVAL Left 2023    Left 2nd toe. done by Dr. Coleman         Social History     Socioeconomic History    Marital status:    Tobacco Use    Smoking status: Former     Current packs/day: 0.00     Average packs/day: 1 pack/day for 16.3 years (16.3 ttl pk-yrs)     Types: Cigarettes     Start date: 1980     Quit date: 1996     Years since quittin.1    Smokeless tobacco: Never   Vaping Use    Vaping status: Never Used   Substance and Sexual Activity    Alcohol use: Never    Drug use: Never    Sexual activity: Yes     Partners: Female     Birth control/protection: None         Family History   Problem Relation Age of Onset    Diabetes Brother        Objective  Physical Exam:  /74 (BP Location: Left arm, Patient Position: Sitting)   Pulse 107   Temp 98 °F " "(36.7 °C) (Oral)   Resp 20   Ht 182.9 cm (72\")   Wt 85.3 kg (188 lb)   SpO2 94%   BMI 25.50 kg/m²      Physical Exam  Vitals and nursing note reviewed.   Constitutional:       Appearance: He is well-developed.   HENT:      Head: Normocephalic and atraumatic.      Mouth/Throat:      Mouth: Mucous membranes are moist.   Pulmonary:      Effort: Pulmonary effort is normal.   Musculoskeletal:         General: Normal range of motion.      Cervical back: Normal range of motion.   Skin:     General: Skin is warm and dry.             Comments: PICC line site clean dry and intact no cellulitis, no drainage   Neurological:      Mental Status: He is alert and oriented to person, place, and time.   Psychiatric:         Behavior: Behavior normal.         Thought Content: Thought content normal.         Judgment: Judgment normal.           Procedures    ED Course:         Lab Results (last 24 hours)       ** No results found for the last 24 hours. **             No radiology results from the last 24 hrs       Medical Decision Making  Problems Addressed:  Encounter for medical screening examination: acute illness or injury  Well adult health check: acute illness or injury          Final diagnoses:   Encounter for medical screening examination   Well adult health check           Disposition discharged       Fredrick Richadrson Jr., PA-C  06/11/24 0127    "

## 2024-08-12 ENCOUNTER — LAB (OUTPATIENT)
Dept: LAB | Facility: HOSPITAL | Age: 64
End: 2024-08-12
Payer: COMMERCIAL

## 2024-08-12 ENCOUNTER — TRANSCRIBE ORDERS (OUTPATIENT)
Dept: LAB | Facility: HOSPITAL | Age: 64
End: 2024-08-12
Payer: COMMERCIAL

## 2024-08-12 DIAGNOSIS — E11.51 TYPE II DIABETES MELLITUS WITH PERIPHERAL CIRCULATORY DISORDER: ICD-10-CM

## 2024-08-12 DIAGNOSIS — M86.672 CHRONIC OSTEOMYELITIS OF HINDFOOT, LEFT: ICD-10-CM

## 2024-08-12 DIAGNOSIS — M86.672 CHRONIC OSTEOMYELITIS OF HINDFOOT, LEFT: Primary | ICD-10-CM

## 2024-08-12 LAB
ALBUMIN SERPL-MCNC: 4.1 G/DL (ref 3.5–5.2)
ALBUMIN/GLOB SERPL: 1.3 G/DL
ALP SERPL-CCNC: 113 U/L (ref 39–117)
ALT SERPL W P-5'-P-CCNC: 18 U/L (ref 1–41)
ANION GAP SERPL CALCULATED.3IONS-SCNC: 9 MMOL/L (ref 5–15)
AST SERPL-CCNC: 23 U/L (ref 1–40)
BILIRUB SERPL-MCNC: 0.5 MG/DL (ref 0–1.2)
BUN SERPL-MCNC: 9 MG/DL (ref 8–23)
BUN/CREAT SERPL: 10.1 (ref 7–25)
CALCIUM SPEC-SCNC: 9 MG/DL (ref 8.6–10.5)
CHLORIDE SERPL-SCNC: 101 MMOL/L (ref 98–107)
CO2 SERPL-SCNC: 27 MMOL/L (ref 22–29)
CREAT SERPL-MCNC: 0.89 MG/DL (ref 0.76–1.27)
CRP SERPL-MCNC: <0.3 MG/DL (ref 0–0.5)
DEPRECATED RDW RBC AUTO: 45.1 FL (ref 37–54)
EGFRCR SERPLBLD CKD-EPI 2021: 96.9 ML/MIN/1.73
ERYTHROCYTE [DISTWIDTH] IN BLOOD BY AUTOMATED COUNT: 14.2 % (ref 12.3–15.4)
ERYTHROCYTE [SEDIMENTATION RATE] IN BLOOD: 15 MM/HR (ref 0–20)
GLOBULIN UR ELPH-MCNC: 3.2 GM/DL
GLUCOSE SERPL-MCNC: 134 MG/DL (ref 65–99)
HCT VFR BLD AUTO: 44.9 % (ref 37.5–51)
HGB BLD-MCNC: 14.9 G/DL (ref 13–17.7)
MCH RBC QN AUTO: 28.9 PG (ref 26.6–33)
MCHC RBC AUTO-ENTMCNC: 33.2 G/DL (ref 31.5–35.7)
MCV RBC AUTO: 87 FL (ref 79–97)
PLATELET # BLD AUTO: 227 10*3/MM3 (ref 140–450)
PMV BLD AUTO: 9.7 FL (ref 6–12)
POTASSIUM SERPL-SCNC: 4.1 MMOL/L (ref 3.5–5.2)
PROT SERPL-MCNC: 7.3 G/DL (ref 6–8.5)
RBC # BLD AUTO: 5.16 10*6/MM3 (ref 4.14–5.8)
SODIUM SERPL-SCNC: 137 MMOL/L (ref 136–145)
WBC NRBC COR # BLD AUTO: 10.93 10*3/MM3 (ref 3.4–10.8)

## 2024-08-12 PROCEDURE — 80053 COMPREHEN METABOLIC PANEL: CPT

## 2024-08-12 PROCEDURE — 86140 C-REACTIVE PROTEIN: CPT

## 2024-08-12 PROCEDURE — 85652 RBC SED RATE AUTOMATED: CPT

## 2024-08-12 PROCEDURE — 36415 COLL VENOUS BLD VENIPUNCTURE: CPT

## 2024-08-12 PROCEDURE — 85027 COMPLETE CBC AUTOMATED: CPT

## 2024-10-29 ENCOUNTER — HOSPITAL ENCOUNTER (EMERGENCY)
Facility: HOSPITAL | Age: 64
Discharge: HOME | End: 2024-10-29
Attending: EMERGENCY MEDICINE | Admitting: EMERGENCY MEDICINE
Payer: COMMERCIAL

## 2024-10-29 ENCOUNTER — APPOINTMENT (OUTPATIENT)
Dept: CT IMAGING | Facility: HOSPITAL | Age: 64
End: 2024-10-29
Payer: COMMERCIAL

## 2024-10-29 VITALS
DIASTOLIC BLOOD PRESSURE: 106 MMHG | OXYGEN SATURATION: 96 % | BODY MASS INDEX: 26.51 KG/M2 | HEART RATE: 90 BPM | HEIGHT: 73 IN | RESPIRATION RATE: 20 BRPM | TEMPERATURE: 97.9 F | SYSTOLIC BLOOD PRESSURE: 158 MMHG | WEIGHT: 200 LBS

## 2024-10-29 DIAGNOSIS — R55 SYNCOPE AND COLLAPSE: ICD-10-CM

## 2024-10-29 DIAGNOSIS — R19.7 DIARRHEA, UNSPECIFIED TYPE: Primary | ICD-10-CM

## 2024-10-29 LAB
ALBUMIN SERPL-MCNC: 4.6 G/DL (ref 3.5–5.2)
ALBUMIN/GLOB SERPL: 1.1 G/DL
ALP SERPL-CCNC: 137 U/L (ref 39–117)
ALT SERPL W P-5'-P-CCNC: 24 U/L (ref 1–41)
ANION GAP SERPL CALCULATED.3IONS-SCNC: 13.5 MMOL/L (ref 5–15)
AST SERPL-CCNC: 26 U/L (ref 1–40)
BASOPHILS # BLD AUTO: 0.04 10*3/MM3 (ref 0–0.2)
BASOPHILS NFR BLD AUTO: 0.3 % (ref 0–1.5)
BILIRUB SERPL-MCNC: 1 MG/DL (ref 0–1.2)
BUN SERPL-MCNC: 16 MG/DL (ref 8–23)
BUN/CREAT SERPL: 14.7 (ref 7–25)
CALCIUM SPEC-SCNC: 10.2 MG/DL (ref 8.6–10.5)
CHLORIDE SERPL-SCNC: 106 MMOL/L (ref 98–107)
CO2 SERPL-SCNC: 22.5 MMOL/L (ref 22–29)
CREAT SERPL-MCNC: 1.09 MG/DL (ref 0.76–1.27)
DEPRECATED RDW RBC AUTO: 39.4 FL (ref 37–54)
EGFRCR SERPLBLD CKD-EPI 2021: 76.7 ML/MIN/1.73
EOSINOPHIL # BLD AUTO: 0.14 10*3/MM3 (ref 0–0.4)
EOSINOPHIL NFR BLD AUTO: 0.9 % (ref 0.3–6.2)
ERYTHROCYTE [DISTWIDTH] IN BLOOD BY AUTOMATED COUNT: 12.6 % (ref 12.3–15.4)
GLOBULIN UR ELPH-MCNC: 4.1 GM/DL
GLUCOSE SERPL-MCNC: 115 MG/DL (ref 65–99)
HCT VFR BLD AUTO: 50.2 % (ref 37.5–51)
HGB BLD-MCNC: 16.9 G/DL (ref 13–17.7)
HOLD SPECIMEN: NORMAL
HOLD SPECIMEN: NORMAL
IMM GRANULOCYTES # BLD AUTO: 0.05 10*3/MM3 (ref 0–0.05)
IMM GRANULOCYTES NFR BLD AUTO: 0.3 % (ref 0–0.5)
LYMPHOCYTES # BLD AUTO: 2.86 10*3/MM3 (ref 0.7–3.1)
LYMPHOCYTES NFR BLD AUTO: 18.3 % (ref 19.6–45.3)
MAGNESIUM SERPL-MCNC: 1.9 MG/DL (ref 1.6–2.4)
MCH RBC QN AUTO: 28.8 PG (ref 26.6–33)
MCHC RBC AUTO-ENTMCNC: 33.7 G/DL (ref 31.5–35.7)
MCV RBC AUTO: 85.7 FL (ref 79–97)
MONOCYTES # BLD AUTO: 1.24 10*3/MM3 (ref 0.1–0.9)
MONOCYTES NFR BLD AUTO: 7.9 % (ref 5–12)
NEUTROPHILS NFR BLD AUTO: 11.32 10*3/MM3 (ref 1.7–7)
NEUTROPHILS NFR BLD AUTO: 72.3 % (ref 42.7–76)
NRBC BLD AUTO-RTO: 0 /100 WBC (ref 0–0.2)
PLATELET # BLD AUTO: 254 10*3/MM3 (ref 140–450)
PMV BLD AUTO: 10.1 FL (ref 6–12)
POTASSIUM SERPL-SCNC: 3.8 MMOL/L (ref 3.5–5.2)
PROT SERPL-MCNC: 8.7 G/DL (ref 6–8.5)
RBC # BLD AUTO: 5.86 10*6/MM3 (ref 4.14–5.8)
SODIUM SERPL-SCNC: 142 MMOL/L (ref 136–145)
TROPONIN T SERPL HS-MCNC: <6 NG/L
WBC NRBC COR # BLD AUTO: 15.65 10*3/MM3 (ref 3.4–10.8)
WHOLE BLOOD HOLD COAG: NORMAL
WHOLE BLOOD HOLD SPECIMEN: NORMAL

## 2024-10-29 PROCEDURE — 70450 CT HEAD/BRAIN W/O DYE: CPT

## 2024-10-29 PROCEDURE — 99284 EMERGENCY DEPT VISIT MOD MDM: CPT

## 2024-10-29 PROCEDURE — 83735 ASSAY OF MAGNESIUM: CPT | Performed by: EMERGENCY MEDICINE

## 2024-10-29 PROCEDURE — 84484 ASSAY OF TROPONIN QUANT: CPT | Performed by: EMERGENCY MEDICINE

## 2024-10-29 PROCEDURE — 93005 ELECTROCARDIOGRAM TRACING: CPT | Performed by: EMERGENCY MEDICINE

## 2024-10-29 PROCEDURE — 85025 COMPLETE CBC W/AUTO DIFF WBC: CPT | Performed by: EMERGENCY MEDICINE

## 2024-10-29 PROCEDURE — 25810000003 SODIUM CHLORIDE 0.9 % SOLUTION: Performed by: EMERGENCY MEDICINE

## 2024-10-29 PROCEDURE — 80053 COMPREHEN METABOLIC PANEL: CPT | Performed by: EMERGENCY MEDICINE

## 2024-10-29 RX ORDER — SODIUM CHLORIDE 0.9 % (FLUSH) 0.9 %
10 SYRINGE (ML) INJECTION AS NEEDED
Status: DISCONTINUED | OUTPATIENT
Start: 2024-10-29 | End: 2024-10-30 | Stop reason: HOSPADM

## 2024-10-29 RX ADMIN — SODIUM CHLORIDE 2000 ML: 9 INJECTION, SOLUTION INTRAVENOUS at 20:58

## 2024-10-30 NOTE — ED PROVIDER NOTES
EMERGENCY DEPARTMENT ENCOUNTER    Pt Name: Seb Pettit  MRN: [id number]  Pt :   1960  Room Number:    Date of encounter:  10/29/2024  PCP: Adilia Raygoza APRN  ED Provider: Gerber Gray MD    Historian: Patient      HPI:  Chief Complaint   Patient presents with    Diarrhea    Syncope          Context: Seb Pettit is a 64 y.o. male who presents to the ED c/o diarrhea, has had multiple episodes of brown, watery diarrhea, starting last night, unable to count the number of episodes.  No abdominal pain, no blood in his stool, no fevers.  He was around his son who had similar symptoms.  Reports that today he began to feel faint, and lost consciousness, landing on the ground, there was head trauma apparently.  Patient states he currently feels well while laying still.  Denies headache, neck pain      PAST MEDICAL HISTORY  Past Medical History:   Diagnosis Date    Diabetes mellitus          PAST SURGICAL HISTORY  Past Surgical History:   Procedure Laterality Date    COLONOSCOPY      ENDOSCOPY      TOE OSTEOPHYTE REMOVAL Left 2023    Left 2nd toe. done by Dr. Coleman         FAMILY HISTORY  Family History   Problem Relation Age of Onset    Diabetes Brother          SOCIAL HISTORY  Social History     Socioeconomic History    Marital status:    Tobacco Use    Smoking status: Former     Current packs/day: 0.00     Average packs/day: 1 pack/day for 16.3 years (16.3 ttl pk-yrs)     Types: Cigarettes     Start date: 1980     Quit date: 1996     Years since quittin.5    Smokeless tobacco: Never   Vaping Use    Vaping status: Never Used   Substance and Sexual Activity    Alcohol use: Never    Drug use: Never    Sexual activity: Yes     Partners: Female     Birth control/protection: None         ALLERGIES  Patient has no known allergies.        REVIEW OF SYSTEMS  Review of Systems   Constitutional:  Negative for chills and fever.   HENT:  Negative for sore throat  and trouble swallowing.    Eyes:  Negative for pain and redness.   Respiratory:  Negative for cough and shortness of breath.    Cardiovascular:  Negative for chest pain and leg swelling.   Gastrointestinal:  Positive for diarrhea. Negative for abdominal pain, nausea and vomiting.   Genitourinary:  Negative for dysuria and urgency.   Musculoskeletal:  Negative for back pain and neck pain.   Skin:  Negative for rash and wound.   Neurological:  Positive for syncope and light-headedness. Negative for dizziness and weakness.        All systems reviewed and negative except for those discussed in HPI.       PHYSICAL EXAM    I have reviewed the triage vital signs and nursing notes.    ED Triage Vitals [10/29/24 2023]   Temp Heart Rate Resp BP SpO2   97.9 °F (36.6 °C) (!) 124 20 115/79 92 %      Temp src Heart Rate Source Patient Position BP Location FiO2 (%)   Oral Monitor Sitting Left arm --       Physical Exam  Constitutional:       Appearance: Normal appearance. He is not ill-appearing.   HENT:      Head: Normocephalic and atraumatic.      Right Ear: External ear normal.      Left Ear: External ear normal.      Nose: Nose normal.      Mouth/Throat:      Mouth: Mucous membranes are dry.      Pharynx: Oropharynx is clear.   Eyes:      Extraocular Movements: Extraocular movements intact.      Conjunctiva/sclera: Conjunctivae normal.      Pupils: Pupils are equal, round, and reactive to light.   Cardiovascular:      Rate and Rhythm: Regular rhythm. Tachycardia present.      Pulses:           Radial pulses are 2+ on the right side and 2+ on the left side.      Heart sounds: No murmur heard.  Pulmonary:      Effort: Pulmonary effort is normal.      Breath sounds: Normal breath sounds.   Abdominal:      General: There is no distension.      Tenderness: There is no abdominal tenderness. There is no guarding.   Musculoskeletal:         General: No swelling or deformity.      Cervical back: Normal range of motion and neck supple.    Skin:     General: Skin is warm and dry.      Capillary Refill: Capillary refill takes less than 2 seconds.      Findings: No rash.   Neurological:      General: No focal deficit present.      Mental Status: He is alert and oriented to person, place, and time.            LAB RESULTS  Recent Results (from the past 24 hours)   Comprehensive Metabolic Panel    Collection Time: 10/29/24  8:31 PM    Specimen: Blood   Result Value Ref Range    Glucose 115 (H) 65 - 99 mg/dL    BUN 16 8 - 23 mg/dL    Creatinine 1.09 0.76 - 1.27 mg/dL    Sodium 142 136 - 145 mmol/L    Potassium 3.8 3.5 - 5.2 mmol/L    Chloride 106 98 - 107 mmol/L    CO2 22.5 22.0 - 29.0 mmol/L    Calcium 10.2 8.6 - 10.5 mg/dL    Total Protein 8.7 (H) 6.0 - 8.5 g/dL    Albumin 4.6 3.5 - 5.2 g/dL    ALT (SGPT) 24 1 - 41 U/L    AST (SGOT) 26 1 - 40 U/L    Alkaline Phosphatase 137 (H) 39 - 117 U/L    Total Bilirubin 1.0 0.0 - 1.2 mg/dL    Globulin 4.1 gm/dL    A/G Ratio 1.1 g/dL    BUN/Creatinine Ratio 14.7 7.0 - 25.0    Anion Gap 13.5 5.0 - 15.0 mmol/L    eGFR 76.7 >60.0 mL/min/1.73   Magnesium    Collection Time: 10/29/24  8:31 PM    Specimen: Blood   Result Value Ref Range    Magnesium 1.9 1.6 - 2.4 mg/dL   Single High Sensitivity Troponin T    Collection Time: 10/29/24  8:31 PM    Specimen: Blood   Result Value Ref Range    HS Troponin T <6 <22 ng/L   Green Top (Gel)    Collection Time: 10/29/24  8:31 PM   Result Value Ref Range    Extra Tube Hold for add-ons.    Lavender Top    Collection Time: 10/29/24  8:31 PM   Result Value Ref Range    Extra Tube hold for add-on    Gold Top - SST    Collection Time: 10/29/24  8:31 PM   Result Value Ref Range    Extra Tube Hold for add-ons.    Light Blue Top    Collection Time: 10/29/24  8:31 PM   Result Value Ref Range    Extra Tube Hold for add-ons.    CBC Auto Differential    Collection Time: 10/29/24  8:31 PM    Specimen: Blood   Result Value Ref Range    WBC 15.65 (H) 3.40 - 10.80 10*3/mm3    RBC 5.86 (H) 4.14 -  5.80 10*6/mm3    Hemoglobin 16.9 13.0 - 17.7 g/dL    Hematocrit 50.2 37.5 - 51.0 %    MCV 85.7 79.0 - 97.0 fL    MCH 28.8 26.6 - 33.0 pg    MCHC 33.7 31.5 - 35.7 g/dL    RDW 12.6 12.3 - 15.4 %    RDW-SD 39.4 37.0 - 54.0 fl    MPV 10.1 6.0 - 12.0 fL    Platelets 254 140 - 450 10*3/mm3    Neutrophil % 72.3 42.7 - 76.0 %    Lymphocyte % 18.3 (L) 19.6 - 45.3 %    Monocyte % 7.9 5.0 - 12.0 %    Eosinophil % 0.9 0.3 - 6.2 %    Basophil % 0.3 0.0 - 1.5 %    Immature Grans % 0.3 0.0 - 0.5 %    Neutrophils, Absolute 11.32 (H) 1.70 - 7.00 10*3/mm3    Lymphocytes, Absolute 2.86 0.70 - 3.10 10*3/mm3    Monocytes, Absolute 1.24 (H) 0.10 - 0.90 10*3/mm3    Eosinophils, Absolute 0.14 0.00 - 0.40 10*3/mm3    Basophils, Absolute 0.04 0.00 - 0.20 10*3/mm3    Immature Grans, Absolute 0.05 0.00 - 0.05 10*3/mm3    nRBC 0.0 0.0 - 0.2 /100 WBC       If labs were ordered, I independently reviewed the results and considered them in treating the patient.        RADIOLOGY  CT Head Without Contrast    Result Date: 10/29/2024  CT HEAD WITHOUT CONTRAST  HISTORY: Syncope with head trauma  TECHNIQUE: Thin-section axial images of the brain were performed without contrast. This study was performed with techniques to keep radiation doses as low as reasonably achievable, (ALARA). Individualized dose reduction techniques using automated exposure control or adjustment of mA and/or kV according to the patient size were employed.  COMPARISON: None  FINDINGS: The sinuses and mastoid air cells are clear. The visualized orbits and globes are unremarkable. The brainstem and posterior fossa are normal. There is no edema or hemorrhage. There is no mass or mass effect. There is mild atrophy with no significant white matter change.      Mild atrophy with no acute intracranial abnormality.  This report was signed and finalized on 10/29/2024 9:40 PM by Joe Meng MD.           PROCEDURES    Procedures    Interpretations    O2 Sat: The patients oxygen saturation  was 96% on Room Air.  This was independently interpreted by me as Normal    EKG: I reviewed and independently interpreted the EKG as sinus tach rate of 102, normal axis, elevated QRS duration right bundle branch block, no ST elevation, no T wave inversions    Cardiac Monitoring: I reviewed and independently interpreted the Rhythm Strip as Sinus Tachycardia rate of 120    Radiology: I ordered and independently reviewed the above noted radiographic studies.  I viewed images of CT Head which showed No intracranial hemorrhage per my independent interpretation. See radiologist's dictation for official interpretation.         MEDICATIONS GIVEN IN ER    Medications   sodium chloride 0.9 % flush 10 mL (has no administration in time range)   sodium chloride 0.9 % bolus 2,000 mL (0 mL Intravenous Stopped 10/29/24 2128)         MEDICAL DECISION MAKING, PROGRESS, and CONSULTS    All labs, if obtained, have been independently reviewed by me.  All radiology studies, if obtained, have been reviewed by me and the radiologist dictating the report.  All EKG's, if obtained, have been independently viewed and interpreted by me      Discussion below represents my analysis of pertinent findings related to patient's condition, differential diagnosis, treatment plan and final disposition.      Differential diagnosis:    64-year-old male presents ED with complaint of syncopal episode, the patient had syncope after he has had diarrhea for almost 24 hours, appears quite dry, is tachycardic, no chest pain, no shortness of breath, lower suspicion for acute PE, suspect 1 orthostatic syncope second to the patient's hydration status.  Will obtain laboratory workup, provide IV fluids, obtain EKG EKG, troponin.  Will obtain CT scan of the head given that he does have the floor when he lost consciousness.    Additional Sources:  External (non-ED) record review:  Discharge summary from [facility] 9/13/2024 after admission for diabetic foot        Orders placed during this visit:  Orders Placed This Encounter   Procedures    Gastrointestinal Panel, PCR - Stool, Per Rectum    Clostridioides difficile Toxin - Stool, Per Rectum    Clostridioides difficile EIA - Stool, Per Rectum    CT Head Without Contrast    [place] Draw    Comprehensive Metabolic Panel    Magnesium    Single High Sensitivity Troponin T    CBC Auto Differential    NPO Diet NPO Type: Strict NPO    Undress & Gown    Continuous Pulse Oximetry    Vital Signs    Orthostatic Blood Pressure    Patient Isolation Contact Spore    Oxygen Therapy- Nasal Cannula; Titrate 1-6 LPM Per SpO2; 90 - 95%    POC Glucose Once    ECG 12 Lead ED Triage Standing Order; Syncope    Insert Peripheral IV    CBC & Differential    Green Top (Gel)    Lavender Top    Gold Top - SST    Light Blue Top         Additional orders considered but not ordered:  None    ED Course:    Consultants:  None    ED Course as of 10/29/24 2244   Tue Oct 29, 2024   2129 CBC & Differential(!)  White blood cell count is elevated [CS]   2129 Single High Sensitivity Troponin T  Troponin is negative [CS]   2130 Comprehensive Metabolic Panel(!)  Chemistries are within normal limits [CS]   2232 Patient is feeling much better, has had no diarrhea while here in the ED, he is abdomen is benign, he is afebrile, normal vital signs, he has no further dizziness or lightheadedness, his heart rate has returned to normal after 2 L of fluid.  I believe the patient was likely quite dry, will discharge the patient home, patient advised to increase oral intake.  He voiced understanding is agreeable to plan for discharge home [CS]      ED Course User Index  [CS] Gerber Gray MD           After my consideration of clinical presentation and any laboratory/radiology studies obtained, I discussed the findings with the patient/patient representative who is in agreement with the treatment plan and the final disposition. Risks and benefits of discharge  were discussed.     AS OF 22:44 EDT VITALS:    BP - (!) 158/106  HR - 90  TEMP - 97.9 °F (36.6 °C) (Oral)  O2 SATS - 96%    I reviewed the patients prescription monitoring report if available prior to discharge    DIAGNOSIS  Final diagnoses:   Diarrhea, unspecified type   Syncope and collapse         DISPOSITION  ED Disposition       ED Disposition   Discharge    Condition   Stable    Comment   --                   Please note that portions of this document were completed with voice recognition software.        Gerber Gray MD  10/29/24 1957